# Patient Record
Sex: FEMALE | Race: WHITE | Employment: OTHER | URBAN - METROPOLITAN AREA
[De-identification: names, ages, dates, MRNs, and addresses within clinical notes are randomized per-mention and may not be internally consistent; named-entity substitution may affect disease eponyms.]

---

## 2023-04-08 ENCOUNTER — HOSPITAL ENCOUNTER (EMERGENCY)
Age: 88
Discharge: HOME | End: 2023-04-08
Attending: EMERGENCY MEDICINE
Payer: MEDICARE

## 2023-04-08 ENCOUNTER — APPOINTMENT (OUTPATIENT)
Dept: GENERAL RADIOLOGY | Age: 88
End: 2023-04-08
Attending: EMERGENCY MEDICINE
Payer: MEDICARE

## 2023-04-08 VITALS
WEIGHT: 180 LBS | DIASTOLIC BLOOD PRESSURE: 83 MMHG | HEART RATE: 72 BPM | OXYGEN SATURATION: 97 % | RESPIRATION RATE: 16 BRPM | TEMPERATURE: 98 F | SYSTOLIC BLOOD PRESSURE: 163 MMHG | HEIGHT: 62 IN | BODY MASS INDEX: 33.13 KG/M2

## 2023-04-08 DIAGNOSIS — R60.9 PERIPHERAL EDEMA: ICD-10-CM

## 2023-04-08 DIAGNOSIS — R53.1 WEAKNESS: Primary | ICD-10-CM

## 2023-04-08 LAB
ALBUMIN SERPL-MCNC: 2.5 G/DL (ref 3.4–5)
ALBUMIN/GLOB SERPL: 0.6 {RATIO} (ref 1–2)
ALP LIVER SERPL-CCNC: 84 U/L
ALT SERPL-CCNC: 17 U/L
ANION GAP SERPL CALC-SCNC: 6 MMOL/L (ref 0–18)
AST SERPL-CCNC: 25 U/L (ref 15–37)
BASOPHILS # BLD AUTO: 0.02 X10(3) UL (ref 0–0.2)
BASOPHILS NFR BLD AUTO: 0.3 %
BILIRUB SERPL-MCNC: 0.6 MG/DL (ref 0.1–2)
BILIRUB UR QL STRIP.AUTO: NEGATIVE
BUN BLD-MCNC: 12 MG/DL (ref 7–18)
CALCIUM BLD-MCNC: 9.1 MG/DL (ref 8.5–10.1)
CHLORIDE SERPL-SCNC: 102 MMOL/L (ref 98–112)
CLARITY UR REFRACT.AUTO: CLEAR
CO2 SERPL-SCNC: 26 MMOL/L (ref 21–32)
COLOR UR AUTO: YELLOW
CREAT BLD-MCNC: 0.86 MG/DL
EOSINOPHIL # BLD AUTO: 0.24 X10(3) UL (ref 0–0.7)
EOSINOPHIL NFR BLD AUTO: 3.3 %
ERYTHROCYTE [DISTWIDTH] IN BLOOD BY AUTOMATED COUNT: 14.7 %
GFR SERPLBLD BASED ON 1.73 SQ M-ARVRAT: 64 ML/MIN/1.73M2 (ref 60–?)
GLOBULIN PLAS-MCNC: 4.4 G/DL (ref 2.8–4.4)
GLUCOSE BLD-MCNC: 126 MG/DL (ref 70–99)
GLUCOSE UR STRIP.AUTO-MCNC: NEGATIVE MG/DL
HCT VFR BLD AUTO: 35.6 %
HGB BLD-MCNC: 11.9 G/DL
IMM GRANULOCYTES # BLD AUTO: 0.04 X10(3) UL (ref 0–1)
IMM GRANULOCYTES NFR BLD: 0.6 %
KETONES UR STRIP.AUTO-MCNC: NEGATIVE MG/DL
LEUKOCYTE ESTERASE UR QL STRIP.AUTO: NEGATIVE
LYMPHOCYTES # BLD AUTO: 0.54 X10(3) UL (ref 1–4)
LYMPHOCYTES NFR BLD AUTO: 7.5 %
MCH RBC QN AUTO: 30.7 PG (ref 26–34)
MCHC RBC AUTO-ENTMCNC: 33.4 G/DL (ref 31–37)
MCV RBC AUTO: 92 FL
MONOCYTES # BLD AUTO: 0.41 X10(3) UL (ref 0.1–1)
MONOCYTES NFR BLD AUTO: 5.7 %
NEUTROPHILS # BLD AUTO: 5.93 X10 (3) UL (ref 1.5–7.7)
NEUTROPHILS # BLD AUTO: 5.93 X10(3) UL (ref 1.5–7.7)
NEUTROPHILS NFR BLD AUTO: 82.6 %
NITRITE UR QL STRIP.AUTO: NEGATIVE
NT-PROBNP SERPL-MCNC: 1026 PG/ML (ref ?–450)
OSMOLALITY SERPL CALC.SUM OF ELEC: 279 MOSM/KG (ref 275–295)
PH UR STRIP.AUTO: 6 [PH] (ref 5–8)
PLATELET # BLD AUTO: 340 10(3)UL (ref 150–450)
POTASSIUM SERPL-SCNC: 4.5 MMOL/L (ref 3.5–5.1)
PROT SERPL-MCNC: 6.9 G/DL (ref 6.4–8.2)
PROT UR STRIP.AUTO-MCNC: NEGATIVE MG/DL
RBC # BLD AUTO: 3.87 X10(6)UL
SARS-COV-2 RNA RESP QL NAA+PROBE: NOT DETECTED
SODIUM SERPL-SCNC: 134 MMOL/L (ref 136–145)
SP GR UR STRIP.AUTO: 1.01 (ref 1–1.03)
TROPONIN I HIGH SENSITIVITY: 14 NG/L
TSI SER-ACNC: 3.11 MIU/ML (ref 0.36–3.74)
UROBILINOGEN UR STRIP.AUTO-MCNC: 1 MG/DL
WBC # BLD AUTO: 7.2 X10(3) UL (ref 4–11)

## 2023-04-08 PROCEDURE — 71046 X-RAY EXAM CHEST 2 VIEWS: CPT | Performed by: EMERGENCY MEDICINE

## 2023-04-08 PROCEDURE — 81015 MICROSCOPIC EXAM OF URINE: CPT | Performed by: EMERGENCY MEDICINE

## 2023-04-08 PROCEDURE — 99285 EMERGENCY DEPT VISIT HI MDM: CPT

## 2023-04-08 PROCEDURE — 93005 ELECTROCARDIOGRAM TRACING: CPT

## 2023-04-08 PROCEDURE — 93010 ELECTROCARDIOGRAM REPORT: CPT

## 2023-04-08 PROCEDURE — 96361 HYDRATE IV INFUSION ADD-ON: CPT

## 2023-04-08 PROCEDURE — 96374 THER/PROPH/DIAG INJ IV PUSH: CPT

## 2023-04-08 PROCEDURE — 84443 ASSAY THYROID STIM HORMONE: CPT | Performed by: EMERGENCY MEDICINE

## 2023-04-08 PROCEDURE — 84484 ASSAY OF TROPONIN QUANT: CPT | Performed by: EMERGENCY MEDICINE

## 2023-04-08 PROCEDURE — 80053 COMPREHEN METABOLIC PANEL: CPT | Performed by: EMERGENCY MEDICINE

## 2023-04-08 PROCEDURE — 81001 URINALYSIS AUTO W/SCOPE: CPT | Performed by: EMERGENCY MEDICINE

## 2023-04-08 PROCEDURE — 83880 ASSAY OF NATRIURETIC PEPTIDE: CPT | Performed by: EMERGENCY MEDICINE

## 2023-04-08 PROCEDURE — 85025 COMPLETE CBC W/AUTO DIFF WBC: CPT | Performed by: EMERGENCY MEDICINE

## 2023-04-08 RX ORDER — METOPROLOL SUCCINATE 100 MG/1
100 TABLET, EXTENDED RELEASE ORAL DAILY
COMMUNITY
End: 2023-04-11 | Stop reason: CLARIF

## 2023-04-08 RX ORDER — SODIUM CHLORIDE 9 MG/ML
125 INJECTION, SOLUTION INTRAVENOUS CONTINUOUS
Status: DISCONTINUED | OUTPATIENT
Start: 2023-04-08 | End: 2023-04-08

## 2023-04-08 RX ORDER — FUROSEMIDE 10 MG/ML
20 INJECTION INTRAMUSCULAR; INTRAVENOUS ONCE
Status: COMPLETED | OUTPATIENT
Start: 2023-04-08 | End: 2023-04-08

## 2023-04-08 RX ORDER — ATORVASTATIN CALCIUM 10 MG/1
10 TABLET, FILM COATED ORAL NIGHTLY
COMMUNITY
End: 2023-04-11 | Stop reason: CLARIF

## 2023-04-08 NOTE — DISCHARGE INSTRUCTIONS
Follow-up with a cardiologist, recommend full cardiac evaluation and echocardiogram, call Monday for an appointment  Continue regular medications  Return if worse  Recheck with primary care physician on Monday

## 2023-04-08 NOTE — ED INITIAL ASSESSMENT (HPI)
Pt states feeling generalized weakness and feels swollen  Co runny nose , denies cough or sob, states feeling sad and concerned about thyroid level, deniess si

## 2023-04-09 LAB
ATRIAL RATE: 71 BPM
P AXIS: -13 DEGREES
P-R INTERVAL: 238 MS
Q-T INTERVAL: 402 MS
QRS DURATION: 80 MS
QTC CALCULATION (BEZET): 436 MS
R AXIS: -29 DEGREES
T AXIS: 4 DEGREES
VENTRICULAR RATE: 71 BPM

## 2023-04-11 ENCOUNTER — APPOINTMENT (OUTPATIENT)
Dept: GENERAL RADIOLOGY | Facility: HOSPITAL | Age: 88
End: 2023-04-11
Attending: EMERGENCY MEDICINE
Payer: MEDICARE

## 2023-04-11 ENCOUNTER — HOSPITAL ENCOUNTER (INPATIENT)
Facility: HOSPITAL | Age: 88
LOS: 7 days | Discharge: HOME HEALTH | End: 2023-04-18
Attending: EMERGENCY MEDICINE | Admitting: HOSPITALIST
Payer: MEDICARE

## 2023-04-11 DIAGNOSIS — M35.3 PMR (POLYMYALGIA RHEUMATICA) (HCC): Primary | ICD-10-CM

## 2023-04-11 DIAGNOSIS — R70.0 ELEVATED SED RATE: ICD-10-CM

## 2023-04-11 DIAGNOSIS — R26.9 GAIT DISTURBANCE: ICD-10-CM

## 2023-04-11 DIAGNOSIS — E87.1 HYPONATREMIA: ICD-10-CM

## 2023-04-11 DIAGNOSIS — R53.1 ACUTE WEAKNESS: ICD-10-CM

## 2023-04-11 PROBLEM — R79.89 AZOTEMIA: Status: ACTIVE | Noted: 2023-04-11

## 2023-04-11 PROBLEM — D64.9 ANEMIA: Status: ACTIVE | Noted: 2023-04-11

## 2023-04-11 PROBLEM — R73.9 HYPERGLYCEMIA: Status: ACTIVE | Noted: 2023-04-11

## 2023-04-11 LAB
ALBUMIN SERPL-MCNC: 2.3 G/DL (ref 3.4–5)
ALBUMIN/GLOB SERPL: 0.5 {RATIO} (ref 1–2)
ALP LIVER SERPL-CCNC: 81 U/L
ALT SERPL-CCNC: 19 U/L
ANION GAP SERPL CALC-SCNC: 8 MMOL/L (ref 0–18)
AST SERPL-CCNC: 25 U/L (ref 15–37)
ATRIAL RATE: 72 BPM
BASOPHILS # BLD AUTO: 0.03 X10(3) UL (ref 0–0.2)
BASOPHILS NFR BLD AUTO: 0.3 %
BILIRUB SERPL-MCNC: 0.7 MG/DL (ref 0.1–2)
BILIRUB UR QL STRIP.AUTO: NEGATIVE
BUN BLD-MCNC: 17 MG/DL (ref 7–18)
CALCIUM BLD-MCNC: 9.1 MG/DL (ref 8.5–10.1)
CHLORIDE SERPL-SCNC: 99 MMOL/L (ref 98–112)
CK SERPL-CCNC: 44 U/L
CO2 SERPL-SCNC: 24 MMOL/L (ref 21–32)
COLOR UR AUTO: YELLOW
CREAT BLD-MCNC: 0.83 MG/DL
EOSINOPHIL # BLD AUTO: 0.3 X10(3) UL (ref 0–0.7)
EOSINOPHIL NFR BLD AUTO: 3 %
ERYTHROCYTE [DISTWIDTH] IN BLOOD BY AUTOMATED COUNT: 14.7 %
ERYTHROCYTE [SEDIMENTATION RATE] IN BLOOD: 83 MM/HR
FLUAV + FLUBV RNA SPEC NAA+PROBE: NEGATIVE
FLUAV + FLUBV RNA SPEC NAA+PROBE: NEGATIVE
GFR SERPLBLD BASED ON 1.73 SQ M-ARVRAT: 67 ML/MIN/1.73M2 (ref 60–?)
GLOBULIN PLAS-MCNC: 4.5 G/DL (ref 2.8–4.4)
GLUCOSE BLD-MCNC: 118 MG/DL (ref 70–99)
GLUCOSE UR STRIP.AUTO-MCNC: NEGATIVE MG/DL
HCT VFR BLD AUTO: 36.5 %
HGB BLD-MCNC: 11.9 G/DL
IMM GRANULOCYTES # BLD AUTO: 0.06 X10(3) UL (ref 0–1)
IMM GRANULOCYTES NFR BLD: 0.6 %
KETONES UR STRIP.AUTO-MCNC: NEGATIVE MG/DL
LEUKOCYTE ESTERASE UR QL STRIP.AUTO: NEGATIVE
LYMPHOCYTES # BLD AUTO: 0.66 X10(3) UL (ref 1–4)
LYMPHOCYTES NFR BLD AUTO: 6.6 %
MCH RBC QN AUTO: 30.3 PG (ref 26–34)
MCHC RBC AUTO-ENTMCNC: 32.6 G/DL (ref 31–37)
MCV RBC AUTO: 92.9 FL
MONOCYTES # BLD AUTO: 0.59 X10(3) UL (ref 0.1–1)
MONOCYTES NFR BLD AUTO: 5.9 %
NEUTROPHILS # BLD AUTO: 8.37 X10 (3) UL (ref 1.5–7.7)
NEUTROPHILS # BLD AUTO: 8.37 X10(3) UL (ref 1.5–7.7)
NEUTROPHILS NFR BLD AUTO: 83.6 %
NITRITE UR QL STRIP.AUTO: NEGATIVE
NT-PROBNP SERPL-MCNC: 1055 PG/ML (ref ?–450)
OSMOLALITY SERPL CALC.SUM OF ELEC: 275 MOSM/KG (ref 275–295)
P AXIS: -8 DEGREES
P-R INTERVAL: 228 MS
PH UR STRIP.AUTO: 5 [PH] (ref 5–8)
PLATELET # BLD AUTO: 349 10(3)UL (ref 150–450)
POTASSIUM SERPL-SCNC: 4 MMOL/L (ref 3.5–5.1)
PROT SERPL-MCNC: 6.8 G/DL (ref 6.4–8.2)
PROT UR STRIP.AUTO-MCNC: NEGATIVE MG/DL
Q-T INTERVAL: 388 MS
QRS DURATION: 88 MS
QTC CALCULATION (BEZET): 424 MS
R AXIS: -30 DEGREES
RBC # BLD AUTO: 3.93 X10(6)UL
RBC UR QL AUTO: NEGATIVE
RHEUMATOID FACT SERPL-ACNC: <10 IU/ML (ref ?–15)
RSV RNA SPEC NAA+PROBE: NEGATIVE
SARS-COV-2 RNA RESP QL NAA+PROBE: NOT DETECTED
SODIUM SERPL-SCNC: 131 MMOL/L (ref 136–145)
SP GR UR STRIP.AUTO: 1.01 (ref 1–1.03)
T AXIS: 18 DEGREES
UROBILINOGEN UR STRIP.AUTO-MCNC: 2 MG/DL
VENTRICULAR RATE: 72 BPM
WBC # BLD AUTO: 10 X10(3) UL (ref 4–11)

## 2023-04-11 PROCEDURE — 71045 X-RAY EXAM CHEST 1 VIEW: CPT | Performed by: EMERGENCY MEDICINE

## 2023-04-11 PROCEDURE — 99223 1ST HOSP IP/OBS HIGH 75: CPT | Performed by: HOSPITALIST

## 2023-04-11 RX ORDER — ACETAMINOPHEN 325 MG/1
650 TABLET ORAL ONCE
Status: COMPLETED | OUTPATIENT
Start: 2023-04-11 | End: 2023-04-11

## 2023-04-11 RX ORDER — PREDNISOLONE ACETATE 10 MG/ML
1 SUSPENSION/ DROPS OPHTHALMIC 3 TIMES DAILY
Status: DISCONTINUED | OUTPATIENT
Start: 2023-04-11 | End: 2023-04-18

## 2023-04-11 RX ORDER — HEPARIN SODIUM 5000 [USP'U]/ML
5000 INJECTION, SOLUTION INTRAVENOUS; SUBCUTANEOUS EVERY 12 HOURS SCHEDULED
Status: DISCONTINUED | OUTPATIENT
Start: 2023-04-11 | End: 2023-04-14

## 2023-04-11 RX ORDER — ATORVASTATIN CALCIUM 40 MG/1
40 TABLET, FILM COATED ORAL DAILY
Status: DISCONTINUED | OUTPATIENT
Start: 2023-04-11 | End: 2023-04-18

## 2023-04-11 RX ORDER — LEVOTHYROXINE SODIUM 0.07 MG/1
75 TABLET ORAL DAILY
Status: DISCONTINUED | OUTPATIENT
Start: 2023-04-12 | End: 2023-04-18

## 2023-04-11 RX ORDER — ACETAMINOPHEN 325 MG/1
TABLET ORAL
Status: COMPLETED
Start: 2023-04-11 | End: 2023-04-11

## 2023-04-11 RX ORDER — ASPIRIN 81 MG/1
81 TABLET ORAL DAILY
Status: DISCONTINUED | OUTPATIENT
Start: 2023-04-12 | End: 2023-04-18

## 2023-04-11 RX ORDER — LEVOTHYROXINE SODIUM 0.07 MG/1
75 TABLET ORAL DAILY
COMMUNITY
Start: 2023-03-01

## 2023-04-11 RX ORDER — PREDNISOLONE ACETATE 10 MG/ML
1 SUSPENSION/ DROPS OPHTHALMIC 3 TIMES DAILY
Status: ON HOLD | COMMUNITY
Start: 2023-04-01 | End: 2023-04-19

## 2023-04-11 RX ORDER — LATANOPROST 50 UG/ML
1 SOLUTION/ DROPS OPHTHALMIC NIGHTLY
Status: DISCONTINUED | OUTPATIENT
Start: 2023-04-11 | End: 2023-04-18

## 2023-04-11 RX ORDER — ATORVASTATIN CALCIUM 40 MG/1
40 TABLET, FILM COATED ORAL DAILY
COMMUNITY
Start: 2023-01-28

## 2023-04-11 RX ORDER — ESCITALOPRAM OXALATE 5 MG/1
5 TABLET ORAL DAILY
Status: DISCONTINUED | OUTPATIENT
Start: 2023-04-11 | End: 2023-04-18

## 2023-04-11 NOTE — ED INITIAL ASSESSMENT (HPI)
GENERALIZED BODY PAIN  BILATERAL LOWER LEG SWELLING   SINCE FEW DAYS WITH DIFFICULTY IN PASSING URINE . DENIES FEVER ' NO SHORTNESS OF BREATH

## 2023-04-12 ENCOUNTER — APPOINTMENT (OUTPATIENT)
Dept: ULTRASOUND IMAGING | Facility: HOSPITAL | Age: 88
End: 2023-04-12
Attending: HOSPITALIST
Payer: MEDICARE

## 2023-04-12 ENCOUNTER — APPOINTMENT (OUTPATIENT)
Dept: CV DIAGNOSTICS | Facility: HOSPITAL | Age: 88
End: 2023-04-12
Attending: HOSPITALIST
Payer: MEDICARE

## 2023-04-12 LAB
GLUCOSE BLD-MCNC: 144 MG/DL (ref 70–99)
GLUCOSE BLD-MCNC: 235 MG/DL (ref 70–99)

## 2023-04-12 PROCEDURE — 93306 TTE W/DOPPLER COMPLETE: CPT | Performed by: HOSPITALIST

## 2023-04-12 PROCEDURE — 99232 SBSQ HOSP IP/OBS MODERATE 35: CPT | Performed by: HOSPITALIST

## 2023-04-12 PROCEDURE — 93970 EXTREMITY STUDY: CPT | Performed by: HOSPITALIST

## 2023-04-12 PROCEDURE — 99223 1ST HOSP IP/OBS HIGH 75: CPT | Performed by: INTERNAL MEDICINE

## 2023-04-12 RX ORDER — METHYLPREDNISOLONE SODIUM SUCCINATE 125 MG/2ML
60 INJECTION, POWDER, LYOPHILIZED, FOR SOLUTION INTRAMUSCULAR; INTRAVENOUS EVERY 12 HOURS
Status: COMPLETED | OUTPATIENT
Start: 2023-04-12 | End: 2023-04-13

## 2023-04-12 NOTE — PLAN OF CARE
NURSING ADMISSION NOTE      Patient admitted via cart from ER to Rm 3626  Generalized pain better 5/10, decline pain med  Up in the [facility] with cane  BLE swollen  Will consult rheumatology in am  Pt has no IV access from ER, bruise noted on left AC. Refused IV insertion, verbalized we are not giving medicine thru IV  Informed patient, its needed for giving emergency medicine if something happens. She will think about it in am  0430:Pt agreed to insert Peripheral IV. Tolerated well. Oriented to room. Safety precautions initiated. Bed in low position. Call light in reach.   Problem: Patient/Family Goals  Goal: Patient/Family Long Term Goal  Description: Patient's Long Term Goal: return to baseline activity/ADL's    Interventions:  - pain med as needed  =consult  -PT/OT   - See additional Care Plan goals for specific interventions  Outcome: Progressing  Goal: Patient/Family Short Term Goal  Description: Patient's Short Term Goal: pain controlled    Interventions:   - pain med  -needs attended  - See additional Care Plan goals for specific interventions  Outcome: Progressing     Problem: PAIN - ADULT  Goal: Verbalizes/displays adequate comfort level or patient's stated pain goal  Description: INTERVENTIONS:  - Encourage pt to monitor pain and request assistance  - Assess pain using appropriate pain scale  - Administer analgesics based on type and severity of pain and evaluate response  - Implement non-pharmacological measures as appropriate and evaluate response  - Consider cultural and social influences on pain and pain management  - Manage/alleviate anxiety  - Utilize distraction and/or relaxation techniques  - Monitor for opioid side effects  - Notify MD/LIP if interventions unsuccessful or patient reports new pain  - Anticipate increased pain with activity and pre-medicate as appropriate  Outcome: Progressing     Problem: SAFETY ADULT - FALL  Goal: Free from fall injury  Description: INTERVENTIONS:  - Assess pt frequently for physical needs  - Identify cognitive and physical deficits and behaviors that affect risk of falls.   - [place] fall precautions as indicated by assessment.  - Educate pt/family on patient safety including physical limitations  - Instruct pt to call for assistance with activity based on assessment  - Modify environment to reduce risk of injury  - Provide assistive devices as appropriate  - Consider OT/PT consult to assist with strengthening/mobility  - Encourage toileting schedule  Outcome: Progressing     Problem: DISCHARGE PLANNING  Goal: Discharge to home or other facility with appropriate resources  Description: INTERVENTIONS:  - Identify barriers to discharge w/pt and caregiver  - Include patient/family/discharge partner in discharge planning  - Arrange for needed discharge resources and transportation as appropriate  - Identify discharge learning needs (meds, wound care, etc)  - Arrange for interpreters to assist at discharge as needed  - Consider post-discharge preferences of patient/family/discharge partner  - Complete POLST form as appropriate  - Assess patient's ability to be responsible for managing their own health  - Refer to Case Management Department for coordinating discharge planning if the patient needs post-hospital services based on physician/LIP order or complex needs related to functional status, cognitive ability or social support system  Outcome: Progressing

## 2023-04-12 NOTE — PLAN OF CARE
Assumed care of 81 y/o female @56, A/Ox4, RA, up w/walker x 1 assist, LT ear hard of hearing, rheumatolgy on, PT/OT consult, RTFA PIV-SL, Echo ordered, will continue to monitor      Problem: MUSCULOSKELETAL - ADULT  Goal: Return mobility to safest level of function  Description: INTERVENTIONS:  - Assess patient stability and activity tolerance for standing, transferring and ambulating w/ or w/o assistive devices  - Assist with transfers and ambulation using safe patient handling equipment as needed  - Ensure adequate protection for wounds/incisions during mobilization  - Obtain PT/OT consults as needed  - Advance activity as appropriate  - Communicate ordered activity level and limitations with patient/family  Outcome: Progressing     Problem: DISCHARGE PLANNING  Goal: Discharge to home or other facility with appropriate resources  Description: INTERVENTIONS:  - Identify barriers to discharge w/pt and caregiver  - Include patient/family/discharge partner in discharge planning  - Arrange for needed discharge resources and transportation as appropriate  - Identify discharge learning needs (meds, wound care, etc)  - Arrange for interpreters to assist at discharge as needed  - Consider post-discharge preferences of patient/family/discharge partner  - Complete POLST form as appropriate  - Assess patient's ability to be responsible for managing their own health  - Refer to Case Management Department for coordinating discharge planning if the patient needs post-hospital services based on physician/LIP order or complex needs related to functional status, cognitive ability or social support system  Outcome: Progressing

## 2023-04-12 NOTE — ED QUICK NOTES
Patient complains of pain to IV site requesting it to be removed. Patient declined additional IV site to be placed at this time, nothing IV ordered at this time. Informed patient that if she does have IV medications ordered for her in the future this topic will be revisited.

## 2023-04-12 NOTE — ED QUICK NOTES
Orders for admission, patient is aware of plan and ready to go upstairs. Any questions, please call ED RN Farzaneh 91 at extension [phone].      Patient Covid vaccination status: Fully vaccinated     COVID Test Ordered in ED: SARS-CoV-2/Flu A and B/RSV by PCR (GeneXpert)    COVID Suspicion at Admission: N/A    Running Infusions:  None    Mental Status/LOC at time of transport: AOx4    Other pertinent information:   CIWA score: N/A   NIH score:  N/A

## 2023-04-13 LAB
CCP IGG SERPL-ACNC: 1.6 U/ML (ref 0–6.9)
CRP SERPL-MCNC: 13.7 MG/DL (ref ?–0.3)
ERYTHROCYTE [SEDIMENTATION RATE] IN BLOOD: 69 MM/HR
GLUCOSE BLD-MCNC: 205 MG/DL (ref 70–99)
GLUCOSE BLD-MCNC: 228 MG/DL (ref 70–99)
NUCLEAR IGG TITR SER IF: POSITIVE {TITER}

## 2023-04-13 PROCEDURE — 99233 SBSQ HOSP IP/OBS HIGH 50: CPT | Performed by: INTERNAL MEDICINE

## 2023-04-13 PROCEDURE — 99232 SBSQ HOSP IP/OBS MODERATE 35: CPT | Performed by: HOSPITALIST

## 2023-04-13 RX ORDER — SULFAMETHOXAZOLE AND TRIMETHOPRIM 800; 160 MG/1; MG/1
1 TABLET ORAL
Status: DISCONTINUED | OUTPATIENT
Start: 2023-04-14 | End: 2023-04-18

## 2023-04-13 RX ORDER — FUROSEMIDE 10 MG/ML
20 INJECTION INTRAMUSCULAR; INTRAVENOUS
Status: DISCONTINUED | OUTPATIENT
Start: 2023-04-13 | End: 2023-04-16

## 2023-04-13 RX ORDER — ALENDRONATE SODIUM 70 MG/1
70 TABLET ORAL
Status: DISCONTINUED | OUTPATIENT
Start: 2023-04-14 | End: 2023-04-18

## 2023-04-13 NOTE — PROGRESS NOTES
Pt AOx4, NSR, room air, VSS. BLE edema improving, patient states she is feeling much better. Lasix, Solumedrol, eye drops. Up with stand by assist, cane and walker present in room. Continue to monitor.

## 2023-04-13 NOTE — DISCHARGE INSTRUCTIONS
From rheumatology standpoint  -- prednisone taper 60mg daily x 7 days, then 50mg daily x 7 days, then 40mg daily until outpatient follow up  -- alendronate once weekly (be sure to drink full glass of water and be seated or standing upright for one hour after taking medication)  -- bactrim (antibiotic) three times a week (Mon, Wed, Fri)  -- get updated labs in 2 weeks  -- my office will call to set up an outpatient appt for 2-3 weeks from now  -- try to watch diet- adopt diabetic diet while on the steroids  -- be sure to monitor your blood pressure at home and follow up with PCP on discharge  -- call my office with questions/concerns    Dr. Gabby Ch, DO  [facility]  4/13/2023    [facility]   [address] [facility] [address]  [address], [address]  Phone: [phone]  Fax: [phone] l

## 2023-04-13 NOTE — PROGRESS NOTES
Assumed care at [facility] x 4. [place] to Left ear. On R/A. Denies pain or discomfort. NSR on tele. US venous Doppler BLE to r/o DVTcompleted.    VSS  PIV to RFA -S/L  Up to the bathroom with walker/GB-1 person SBA

## 2023-04-14 ENCOUNTER — TELEPHONE (OUTPATIENT)
Dept: RHEUMATOLOGY | Facility: CLINIC | Age: 88
End: 2023-04-14

## 2023-04-14 LAB
ATRIAL RATE: 122 BPM
Q-T INTERVAL: 312 MS
QRS DURATION: 88 MS
QTC CALCULATION (BEZET): 443 MS
R AXIS: -23 DEGREES
T AXIS: 102 DEGREES
VENTRICULAR RATE: 121 BPM

## 2023-04-14 PROCEDURE — 99232 SBSQ HOSP IP/OBS MODERATE 35: CPT | Performed by: HOSPITALIST

## 2023-04-14 RX ORDER — FUROSEMIDE 20 MG/1
20 TABLET ORAL DAILY
Qty: 30 TABLET | Refills: 0 | Status: SHIPPED | OUTPATIENT
Start: 2023-04-14 | End: 2023-04-17

## 2023-04-14 RX ORDER — SULFAMETHOXAZOLE AND TRIMETHOPRIM 800; 160 MG/1; MG/1
1 TABLET ORAL
Qty: 36 TABLET | Refills: 0 | Status: SHIPPED | OUTPATIENT
Start: 2023-04-14 | End: 2023-04-17

## 2023-04-14 RX ORDER — DILTIAZEM HYDROCHLORIDE 5 MG/ML
5 INJECTION INTRAVENOUS ONCE
Status: COMPLETED | OUTPATIENT
Start: 2023-04-14 | End: 2023-04-14

## 2023-04-14 RX ORDER — FLUTICASONE PROPIONATE 50 MCG
1 SPRAY, SUSPENSION (ML) NASAL DAILY
Status: DISCONTINUED | OUTPATIENT
Start: 2023-04-14 | End: 2023-04-18

## 2023-04-14 RX ORDER — ALENDRONATE SODIUM 70 MG/1
70 TABLET ORAL
Qty: 12 TABLET | Refills: 0 | Status: SHIPPED | OUTPATIENT
Start: 2023-04-21 | End: 2023-04-17

## 2023-04-14 RX ORDER — ENOXAPARIN SODIUM 100 MG/ML
1 INJECTION SUBCUTANEOUS EVERY 12 HOURS SCHEDULED
Status: DISCONTINUED | OUTPATIENT
Start: 2023-04-14 | End: 2023-04-14

## 2023-04-14 RX ORDER — PREDNISONE 20 MG/1
TABLET ORAL
Qty: 180 TABLET | Refills: 1 | Status: SHIPPED | OUTPATIENT
Start: 2023-04-14 | End: 2023-04-17

## 2023-04-14 NOTE — CM/SW NOTE
04/14/23 1120   Choice of Post-Acute Provider   Informed patient of right to choose their preferred provider Yes   List of appropriate post-acute services provided to patient/family with quality data Yes   Patient/family choice [facility]- [address] [place] [address]   Information given to Patient;Daughter   Spoke to pt in the room, who indicated her dtr may wish to use the company she had with her 's care. Pt's dtr Eliza Alves contacted and she chose [facility]. Agency reserved in [address]. CM/SW will remain available for DC planning and support.     Lexi Collins, AMANDAN, V[facility]    [id number]

## 2023-04-14 NOTE — PROGRESS NOTES
Called patient's daughter Travis Gandhi per HIPAA. Discussed rheumatology plan going forward. Instructions included in discharge papers. Confirmed pharmacy and prescription sent. Recommended she clarify her other questions with hospitalist/ prior to discharge.     Xavi Royal,   [facility]  4/14/2023

## 2023-04-14 NOTE — PLAN OF CARE
Assumed care of [address]/o female @56  from home w/daughter, A&Ox4, RA, went into Afib-consulted cards-IV push cardizem x1, started elequis, carb controlled diet-no accuchecks, gets up w/cane or walker-SBA, continent of bowel/bladder, meds per [facility], denies any pain, safety precautions in place, will continue to monitor       Problem: SAFETY ADULT - FALL  Goal: Free from fall injury  Description: INTERVENTIONS:  - Assess pt frequently for physical needs  - Identify cognitive and physical deficits and behaviors that affect risk of falls.   - [place] fall precautions as indicated by assessment.  - Educate pt/family on patient safety including physical limitations  - Instruct pt to call for assistance with activity based on assessment  - Modify environment to reduce risk of injury  - Provide assistive devices as appropriate  - Consider OT/PT consult to assist with strengthening/mobility  - Encourage toileting schedule  Outcome: Progressing     Problem: MUSCULOSKELETAL - ADULT  Goal: Return mobility to safest level of function  Description: INTERVENTIONS:  - Assess patient stability and activity tolerance for standing, transferring and ambulating w/ or w/o assistive devices  - Assist with transfers and ambulation using safe patient handling equipment as needed  - Ensure adequate protection for wounds/incisions during mobilization  - Obtain PT/OT consults as needed  - Advance activity as appropriate  - Communicate ordered activity level and limitations with patient/family  Outcome: Progressing

## 2023-04-14 NOTE — TELEPHONE ENCOUNTER
Called pharmacy, spoke to pharmacist -  clarified sig with Dr. Bertha Hooks 60mg X1 wk, 50mg x1 wk then stay on 40mg. repeat labs in 2 wks from discharge date.      Okay to dispense 10mg tablets, but increase number of pills dispensed to 270

## 2023-04-14 NOTE — PLAN OF CARE
Assumed care at [address]. No acute distress noted. Pt A&Ox4. Room air. NSR. Carb controlled diet. Ambulatory SBA w/ cane or walker. PT rec HHPT- SW/Case mgmt following. Continent of bowel and bladder, brief and suzan in place. Medicated per MAR. No c/o pain. No n/v/d. Updated on POC: Steroid switching to PO tomorrow. Safety precautions in place. All needs met. Problem: PAIN - ADULT  Goal: Verbalizes/displays adequate comfort level or patient's stated pain goal  Description: INTERVENTIONS:  - Encourage pt to monitor pain and request assistance  - Assess pain using appropriate pain scale  - Administer analgesics based on type and severity of pain and evaluate response  - Implement non-pharmacological measures as appropriate and evaluate response  - Consider cultural and social influences on pain and pain management  - Manage/alleviate anxiety  - Utilize distraction and/or relaxation techniques  - Monitor for opioid side effects  - Notify MD/LIP if interventions unsuccessful or patient reports new pain  - Anticipate increased pain with activity and pre-medicate as appropriate  Outcome: Progressing     Problem: SAFETY ADULT - FALL  Goal: Free from fall injury  Description: INTERVENTIONS:  - Assess pt frequently for physical needs  - Identify cognitive and physical deficits and behaviors that affect risk of falls.   - [place] fall precautions as indicated by assessment.  - Educate pt/family on patient safety including physical limitations  - Instruct pt to call for assistance with activity based on assessment  - Modify environment to reduce risk of injury  - Provide assistive devices as appropriate  - Consider OT/PT consult to assist with strengthening/mobility  - Encourage toileting schedule  Outcome: Progressing     Problem: DISCHARGE PLANNING  Goal: Discharge to home or other facility with appropriate resources  Description: INTERVENTIONS:  - Identify barriers to discharge w/pt and caregiver  - Include patient/family/discharge partner in discharge planning  - Arrange for needed discharge resources and transportation as appropriate  - Identify discharge learning needs (meds, wound care, etc)  - Arrange for interpreters to assist at discharge as needed  - Consider post-discharge preferences of patient/family/discharge partner  - Complete POLST form as appropriate  - Assess patient's ability to be responsible for managing their own health  - Refer to Case Management Department for coordinating discharge planning if the patient needs post-hospital services based on physician/LIP order or complex needs related to functional status, cognitive ability or social support system  Outcome: Progressing     Problem: MUSCULOSKELETAL - ADULT  Goal: Return mobility to safest level of function  Description: INTERVENTIONS:  - Assess patient stability and activity tolerance for standing, transferring and ambulating w/ or w/o assistive devices  - Assist with transfers and ambulation using safe patient handling equipment as needed  - Ensure adequate protection for wounds/incisions during mobilization  - Obtain PT/OT consults as needed  - Advance activity as appropriate  - Communicate ordered activity level and limitations with patient/family  Outcome: Progressing     Problem: Impaired Activities of Daily Living  Goal: Achieve highest/safest level of independence in self care  Description: Interventions:  - Assess ability and encourage patient to participate in ADLs to maximize function  - Promote sitting position while performing ADLs such as feeding, grooming, and bathing  - Educate and encourage patient/family in tolerated functional activity level and precautions during self-care  Outcome: Progressing

## 2023-04-15 LAB
ANA NUCLEOLAR TITR SER IF: 160 {TITER}
ANION GAP SERPL CALC-SCNC: 6 MMOL/L (ref 0–18)
BUN BLD-MCNC: 29 MG/DL (ref 7–18)
C3 SERPL-MCNC: 127 MG/DL (ref 90–180)
C4 SERPL-MCNC: 20.8 MG/DL (ref 10–40)
CALCIUM BLD-MCNC: 8.7 MG/DL (ref 8.5–10.1)
CHLORIDE SERPL-SCNC: 107 MMOL/L (ref 98–112)
CO2 SERPL-SCNC: 26 MMOL/L (ref 21–32)
CREAT BLD-MCNC: 0.86 MG/DL
GFR SERPLBLD BASED ON 1.73 SQ M-ARVRAT: 64 ML/MIN/1.73M2 (ref 60–?)
GLUCOSE BLD-MCNC: 132 MG/DL (ref 70–99)
MAGNESIUM SERPL-MCNC: 2.3 MG/DL (ref 1.6–2.6)
OSMOLALITY SERPL CALC.SUM OF ELEC: 296 MOSM/KG (ref 275–295)
POTASSIUM SERPL-SCNC: 3.9 MMOL/L (ref 3.5–5.1)
SODIUM SERPL-SCNC: 139 MMOL/L (ref 136–145)

## 2023-04-15 PROCEDURE — 99232 SBSQ HOSP IP/OBS MODERATE 35: CPT | Performed by: HOSPITALIST

## 2023-04-15 NOTE — PROGRESS NOTES
Pt A&Ox3 Room Air  Resting in bed  Denies pain at this time  A.fib on Tele  Meds given per Mar  Voids  PT rec Home with Home PT  Safety precaution maintained  Will Continue to monitor

## 2023-04-16 PROCEDURE — 99232 SBSQ HOSP IP/OBS MODERATE 35: CPT | Performed by: HOSPITALIST

## 2023-04-16 RX ORDER — DOCUSATE SODIUM 100 MG/1
100 CAPSULE, LIQUID FILLED ORAL 2 TIMES DAILY
Status: DISCONTINUED | OUTPATIENT
Start: 2023-04-16 | End: 2023-04-18

## 2023-04-16 NOTE — PLAN OF CARE
Assumed care of pt @ 0730. Pt is A/Ox 4. On RA, VSS, afib on tele. Afib rvr at times SVR others, HR not controlled, better with PO cardizem  IV saline locked, flushed. Tolerating diet. PT/OT recommending- home with HH  Pt denies pain  Up as tolerated with walker  Intake/outputs WNL. Plan control HR, metoprolol increased, PO cardizem added. Updated POC with patient and family. Will continue to monitor. Problem: Patient/Family Goals  Goal: Patient/Family Long Term Goal  Description: Patient's Long Term Goal: return to baseline activity/ADL's    Interventions:  - pain med as needed  =consult  -PT/OT   - See additional Care Plan goals for specific interventions  Outcome: Progressing  Goal: Patient/Family Short Term Goal  Description: Patient's Short Term Goal: pain controlled    Interventions:   - pain med  -needs attended  - See additional Care Plan goals for specific interventions  Outcome: Progressing     Problem: PAIN - ADULT  Goal: Verbalizes/displays adequate comfort level or patient's stated pain goal  Description: INTERVENTIONS:  - Encourage pt to monitor pain and request assistance  - Assess pain using appropriate pain scale  - Administer analgesics based on type and severity of pain and evaluate response  - Implement non-pharmacological measures as appropriate and evaluate response  - Consider cultural and social influences on pain and pain management  - Manage/alleviate anxiety  - Utilize distraction and/or relaxation techniques  - Monitor for opioid side effects  - Notify MD/LIP if interventions unsuccessful or patient reports new pain  - Anticipate increased pain with activity and pre-medicate as appropriate  Outcome: Progressing     Problem: SAFETY ADULT - FALL  Goal: Free from fall injury  Description: INTERVENTIONS:  - Assess pt frequently for physical needs  - Identify cognitive and physical deficits and behaviors that affect risk of falls.   - [place] fall precautions as indicated by assessment.  - Educate pt/family on patient safety including physical limitations  - Instruct pt to call for assistance with activity based on assessment  - Modify environment to reduce risk of injury  - Provide assistive devices as appropriate  - Consider OT/PT consult to assist with strengthening/mobility  - Encourage toileting schedule  Outcome: Progressing     Problem: DISCHARGE PLANNING  Goal: Discharge to home or other facility with appropriate resources  Description: INTERVENTIONS:  - Identify barriers to discharge w/pt and caregiver  - Include patient/family/discharge partner in discharge planning  - Arrange for needed discharge resources and transportation as appropriate  - Identify discharge learning needs (meds, wound care, etc)  - Arrange for interpreters to assist at discharge as needed  - Consider post-discharge preferences of patient/family/discharge partner  - Complete POLST form as appropriate  - Assess patient's ability to be responsible for managing their own health  - Refer to Case Management Department for coordinating discharge planning if the patient needs post-hospital services based on physician/LIP order or complex needs related to functional status, cognitive ability or social support system  Outcome: Progressing     Problem: MUSCULOSKELETAL - ADULT  Goal: Return mobility to safest level of function  Description: INTERVENTIONS:  - Assess patient stability and activity tolerance for standing, transferring and ambulating w/ or w/o assistive devices  - Assist with transfers and ambulation using safe patient handling equipment as needed  - Ensure adequate protection for wounds/incisions during mobilization  - Obtain PT/OT consults as needed  - Advance activity as appropriate  - Communicate ordered activity level and limitations with patient/family  Outcome: Progressing     Problem: Impaired Functional Mobility  Goal: Achieve highest/safest level of mobility/gait  Description: Interventions:  - Assess patient's functional ability and stability  - Promote increasing activity/tolerance for mobility and gait  - Educate and engage patient/family in tolerated activity level and precautions  - Recommend use of  RW for transfers and ambulation  Outcome: Progressing     Problem: Impaired Activities of Daily Living  Goal: Achieve highest/safest level of independence in self care  Description: Interventions:  - Assess ability and encourage patient to participate in ADLs to maximize function  - Promote sitting position while performing ADLs such as feeding, grooming, and bathing  - Educate and encourage patient/family in tolerated functional activity level and precautions during self-care  - Provide support under elbow of weak side to prevent shoulder subluxation  Outcome: Progressing

## 2023-04-16 NOTE — PLAN OF CARE
Assumed patient care at 730. A/Ox4. Room air. Afib on tele. Carb control diet. Lasix given per [facility]. Ambulates with walker. Family at bedside. All safety precautions are in place and will continue with plan of care.       Problem: CARDIOVASCULAR - ADULT  Goal: Absence of cardiac arrhythmias or at baseline  Description: INTERVENTIONS:  - Continuous cardiac monitoring, monitor vital signs, obtain 12 lead EKG if indicated  - Evaluate effectiveness of antiarrhythmic and heart rate control medications as ordered  - Initiate emergency measures for life threatening arrhythmias  - Monitor electrolytes and administer replacement therapy as ordered  Outcome: Progressing     Problem: DISCHARGE PLANNING  Goal: Discharge to home or other facility with appropriate resources  Description: INTERVENTIONS:  - Identify barriers to discharge w/pt and caregiver  - Include patient/family/discharge partner in discharge planning  - Arrange for needed discharge resources and transportation as appropriate  - Identify discharge learning needs (meds, wound care, etc)  - Arrange for interpreters to assist at discharge as needed  - Consider post-discharge preferences of patient/family/discharge partner  - Complete POLST form as appropriate  - Assess patient's ability to be responsible for managing their own health  - Refer to Case Management Department for coordinating discharge planning if the patient needs post-hospital services based on physician/LIP order or complex needs related to functional status, cognitive ability or social support system  4/16/2023 1557 by Adalberto Ormond, RN  Outcome: Progressing  4/16/2023 1556 by Adalberto Ormond, RN  Outcome: Progressing

## 2023-04-17 ENCOUNTER — APPOINTMENT (OUTPATIENT)
Dept: ULTRASOUND IMAGING | Facility: HOSPITAL | Age: 88
End: 2023-04-17
Attending: HOSPITALIST
Payer: MEDICARE

## 2023-04-17 LAB — HGB BLD-MCNC: 14.4 G/DL

## 2023-04-17 PROCEDURE — 76882 US LMTD JT/FCL EVL NVASC XTR: CPT | Performed by: HOSPITALIST

## 2023-04-17 PROCEDURE — 99239 HOSP IP/OBS DSCHRG MGMT >30: CPT | Performed by: HOSPITALIST

## 2023-04-17 RX ORDER — METOPROLOL TARTRATE 50 MG/1
50 TABLET, FILM COATED ORAL 2 TIMES DAILY
Qty: 60 TABLET | Refills: 3 | Status: SHIPPED
Start: 2023-04-17 | End: 2023-04-18

## 2023-04-17 RX ORDER — ALENDRONATE SODIUM 70 MG/1
70 TABLET ORAL
Qty: 12 TABLET | Refills: 0 | Status: SHIPPED | OUTPATIENT
Start: 2023-04-21 | End: 2023-04-19

## 2023-04-17 RX ORDER — SULFAMETHOXAZOLE AND TRIMETHOPRIM 800; 160 MG/1; MG/1
1 TABLET ORAL
Qty: 36 TABLET | Refills: 0 | Status: SHIPPED | OUTPATIENT
Start: 2023-04-17 | End: 2023-04-18

## 2023-04-17 RX ORDER — PREDNISONE 20 MG/1
TABLET ORAL
Qty: 180 TABLET | Refills: 1 | Status: SHIPPED | OUTPATIENT
Start: 2023-04-17 | End: 2023-04-18

## 2023-04-17 RX ORDER — FUROSEMIDE 20 MG/1
20 TABLET ORAL DAILY
Qty: 30 TABLET | Refills: 0 | Status: SHIPPED | OUTPATIENT
Start: 2023-04-17 | End: 2023-04-18

## 2023-04-17 NOTE — CM/SW NOTE
Print Eliquis script sent to the [employer] for a price check. Will follow up once response received. KAMARI Benoit, RN-BC    [id number]    Addendum: Katiana Farfan from [employer] confirmed that Eliquis will cost $47/month and she is eligible for the savings card for first 30 days free. Katiana Farfan can fill script and apply savings card directly at discharge - RN to call when ready for dc.

## 2023-04-17 NOTE — CM/SW NOTE
Call received from Freda at [facility]. Provided him with contact information for pt's PCP office. STEFFANY/DREW to send AVS to agency at time of discharge. KAMARI Martínez, RN-BC    [id number]    Addendum: AVS sent to [facility] via [employer] and notified of plan for dc home today.

## 2023-04-17 NOTE — CM/SW NOTE
[employer] COST 8S $47/MONTH. FREE COUPON APPLIED FOR 30 DAYS.  CALL [facility] [phone] TO DELIVER TO PATIENT

## 2023-04-17 NOTE — PLAN OF CARE
Assumed care at [address] [facility]. No acute distress noted. Pt A&Ox4. Room air. NSR. Carb controlled diet. Continent of bowel and bladder. Ambulatory x1 w/ walker, SBA, BRP. PT rec HHPT. BLE edema. Medicated per MAR. No c/o pain. No n/v/d. Patient updated on POC. Safety precautions in place. All needs met.      Problem: CARDIOVASCULAR - ADULT  Goal: Absence of cardiac arrhythmias or at baseline  Description: INTERVENTIONS:  - Continuous cardiac monitoring, monitor vital signs, obtain 12 lead EKG if indicated  - Evaluate effectiveness of antiarrhythmic and heart rate control medications as ordered  - Initiate emergency measures for life threatening arrhythmias  - Monitor electrolytes and administer replacement therapy as ordered  Outcome: Not Progressing     Problem: Impaired Activities of Daily Living  Goal: Achieve highest/safest level of independence in self care  Description: Interventions:  - Assess ability and encourage patient to participate in ADLs to maximize function  - Promote sitting position while performing ADLs such as feeding, grooming, and bathing  - Educate and encourage patient/family in tolerated functional activity level and   Outcome: Not Progressing     Problem: Impaired Functional Mobility  Goal: Achieve highest/safest level of mobility/gait  Description: Interventions:  - Assess patient's functional ability and stability  - Promote increasing activity/tolerance for mobility and gait  - Educate and engage patient/family in tolerated activity level and precautions  Outcome: Not Progressing     Problem: MUSCULOSKELETAL - ADULT  Goal: Return mobility to safest level of function  Description: INTERVENTIONS:  - Assess patient stability and activity tolerance for standing, transferring and ambulating w/ or w/o assistive devices  - Assist with transfers and ambulation using safe patient handling equipment as needed  - Ensure adequate protection for wounds/incisions during mobilization  - Obtain PT/OT consults as needed  - Advance activity as appropriate  - Communicate ordered activity level and limitations with patient/family  Outcome: Not Progressing     Problem: Patient/Family Goals  Goal: Patient/Family Long Term Goal  Description: Patient's Long Term Goal: return to baseline activity/ADL's    Interventions:  - pain med as needed  =consult  -PT/OT   - See additional Care Plan goals for specific interventions  Outcome: Not Progressing  Goal: Patient/Family Short Term Goal  Description: Patient's Short Term Goal: pain controlled    Interventions:   - pain med  -needs attended  - See additional Care Plan goals for specific interventions  Outcome: Not Progressing

## 2023-04-17 NOTE — PLAN OF CARE
Assumed patient care at 0730  AOx3, RA  Afib: HR >140s when ambulating  Carb controlled diet  Accucheck   Continent of bowel and bladder  SBAx1 with walker. Reports no pain and no n/v  Reports dizziness during ambulation  Bed in lowest precautions and call light within reach  Safety precautions in place  Needs met at this time.                        Problem: PAIN - ADULT  Goal: Verbalizes/displays adequate comfort level or patient's stated pain goal  Description: INTERVENTIONS:  - Encourage pt to monitor pain and request assistance  - Assess pain using appropriate pain scale  - Administer analgesics based on type and severity of pain and evaluate response  - Implement non-pharmacological measures as appropriate and evaluate response  - Consider cultural and social influences on pain and pain management  - Manage/alleviate anxiety  - Utilize distraction and/or relaxation techniques  - Monitor for opioid side effects  - Notify MD/LIP if interventions unsuccessful or patient reports new pain  - Anticipate increased pain with activity and pre-medicate as appropriate  Outcome: Progressing

## 2023-04-18 VITALS
BODY MASS INDEX: 34.4 KG/M2 | DIASTOLIC BLOOD PRESSURE: 75 MMHG | HEART RATE: 50 BPM | SYSTOLIC BLOOD PRESSURE: 126 MMHG | TEMPERATURE: 97 F | RESPIRATION RATE: 16 BRPM | HEIGHT: 62 IN | OXYGEN SATURATION: 96 % | WEIGHT: 186.94 LBS

## 2023-04-18 LAB
CENTROMERE IGG SER-ACNC: 39.6 U/ML
DSDNA IGG SERPL IA-ACNC: 3.1 IU/ML
ENA AB SER QL IA: 2.5 UG/L
ENA AB SER QL IA: POSITIVE
ENA JO1 AB SER IA-ACNC: 0.3 U/ML
ENA RNP IGG SER IA-ACNC: 1.8 U/ML
ENA SCL70 IGG SER IA-ACNC: 1 U/ML
ENA SM IGG SER IA-ACNC: <0.7 U/ML
ENA SS-A IGG SER IA-ACNC: <0.4 U/ML
ENA SS-B IGG SER IA-ACNC: 0.4 U/ML
U1 SNRNP IGG SER IA-ACNC: 2.3 U/ML

## 2023-04-18 PROCEDURE — 99239 HOSP IP/OBS DSCHRG MGMT >30: CPT | Performed by: HOSPITALIST

## 2023-04-18 RX ORDER — PREDNISONE 20 MG/1
TABLET ORAL
Qty: 180 TABLET | Refills: 1 | Status: SHIPPED | OUTPATIENT
Start: 2023-04-18

## 2023-04-18 RX ORDER — SULFAMETHOXAZOLE AND TRIMETHOPRIM 800; 160 MG/1; MG/1
1 TABLET ORAL
Qty: 36 TABLET | Refills: 0 | Status: SHIPPED | OUTPATIENT
Start: 2023-04-19 | End: 2023-07-18

## 2023-04-18 RX ORDER — METOPROLOL TARTRATE 50 MG/1
50 TABLET, FILM COATED ORAL 2 TIMES DAILY
Qty: 60 TABLET | Refills: 3 | Status: SHIPPED | OUTPATIENT
Start: 2023-04-18 | End: 2023-04-20

## 2023-04-18 RX ORDER — FUROSEMIDE 20 MG/1
20 TABLET ORAL DAILY
Qty: 30 TABLET | Refills: 0 | Status: SHIPPED | OUTPATIENT
Start: 2023-04-18 | End: 2023-05-18

## 2023-04-18 NOTE — PLAN OF CARE
Assumed patient care at 730. A/Ox4. Room air. Afib on tele. Carb control diet. Right forearm hematoma from previous iv site elevated, compression and cold compress used. Ambulates with walker. Plan is for discharge today to home when ride is available. All safety precautions are in place and will continue with plan of care.     Problem: RISK FOR INFECTION - ADULT  Goal: Absence of fever/infection during anticipated neutropenic period  Description: INTERVENTIONS  - Monitor WBC  - Administer growth factors as ordered  - Implement neutropenic guidelines  Outcome: Progressing     Problem: PAIN - ADULT  Goal: Verbalizes/displays adequate comfort level or patient's stated pain goal  Description: INTERVENTIONS:  - Encourage pt to monitor pain and request assistance  - Assess pain using appropriate pain scale  - Administer analgesics based on type and severity of pain and evaluate response  - Implement non-pharmacological measures as appropriate and evaluate response  - Consider cultural and social influences on pain and pain management  - Manage/alleviate anxiety  - Utilize distraction and/or relaxation techniques  - Monitor for opioid side effects  - Notify MD/LIP if interventions unsuccessful or patient reports new pain  - Anticipate increased pain with activity and pre-medicate as appropriate  Outcome: Progressing

## 2023-04-18 NOTE — PLAN OF CARE
Assumed care at 1. No acute distress noted, family at bedside. Pt A&Ox4. Room air. Afib on tele, rate controlled, 66 currently. Carb controlled diet. Continent of bowel and bladder, pull ups and suzan in place. Ambulatory x1 w/ walker, SBA, BRP. PT rec HH w/ PT- SW following. Medicated per MAR. No c/o pain. No n/v/d.   IV removed by staff prior to planned discharge, bleeding began. Pressure applied. Hematoma developed. US showed 3.2 cm hematoma. Hgb 14.4. Eliquis on hold per MD. Ice packs applied. Pt medically cleared for discharge. Patient and family updated on POC. Safety precautions in place. Resting comfortably in bed. All needs met. Problem: PAIN - ADULT  Goal: Verbalizes/displays adequate comfort level or patient's stated pain goal  Description: INTERVENTIONS:  - Encourage pt to monitor pain and request assistance  - Assess pain using appropriate pain scale  - Administer analgesics based on type and severity of pain and evaluate response  - Implement non-pharmacological measures as appropriate and evaluate response  - Consider cultural and social influences on pain and pain management  - Manage/alleviate anxiety  - Utilize distraction and/or relaxation techniques  - Monitor for opioid side effects  - Notify MD/LIP if interventions unsuccessful or patient reports new pain  - Anticipate increased pain with activity and pre-medicate as appropriate  Outcome: Progressing     Problem: RISK FOR INFECTION - ADULT  Goal: Absence of fever/infection during anticipated neutropenic period  Description: INTERVENTIONS  - Monitor WBC  - Administer growth factors as ordered  - Implement neutropenic guidelines  Outcome: Progressing     Problem: SAFETY ADULT - FALL  Goal: Free from fall injury  Description: INTERVENTIONS:  - Assess pt frequently for physical needs  - Identify cognitive and physical deficits and behaviors that affect risk of falls. - [place] fall precautions as indicated by assessment.   - Educate pt/family on patient safety including physical limitations  - Instruct pt to call for assistance with activity based on assessment  - Modify environment to reduce risk of injury  - Provide assistive devices as appropriate  - Consider OT/PT consult to assist with strengthening/mobility  - Encourage toileting schedule  Outcome: Progressing

## 2023-04-18 NOTE — DISCHARGE PLANNING
Tele removed from patient. Went over discharge paperwork with pt and pts family. Answered any questions pt had at time of discharge.

## 2023-04-18 NOTE — CM/SW NOTE
Pt was reportedly medically cleared for DC last PM and was getting ready to leave when she developed a large hematoma at her dc'd IV site. DC was held. Pt identified as medically ready for DC this am in multidisciplinary rounds. [facility] notified that pt will dc today. AVS sent via [employer].      Pt's dtr to transport her home this PM.    AMANDA GarcíaN, VIA [facility]    [id number]

## 2023-04-19 ENCOUNTER — HOSPITAL ENCOUNTER (OUTPATIENT)
Facility: HOSPITAL | Age: 88
Setting detail: OBSERVATION
Discharge: HOME | End: 2023-04-20
Attending: EMERGENCY MEDICINE | Admitting: INTERNAL MEDICINE
Payer: MEDICARE

## 2023-04-19 ENCOUNTER — APPOINTMENT (OUTPATIENT)
Dept: GENERAL RADIOLOGY | Facility: HOSPITAL | Age: 88
End: 2023-04-19
Attending: EMERGENCY MEDICINE
Payer: MEDICARE

## 2023-04-19 ENCOUNTER — PATIENT OUTREACH (OUTPATIENT)
Dept: CASE MANAGEMENT | Age: 88
End: 2023-04-19

## 2023-04-19 DIAGNOSIS — R07.9 CHEST PAIN OF UNCERTAIN ETIOLOGY: Primary | ICD-10-CM

## 2023-04-19 DIAGNOSIS — Z02.9 ENCOUNTERS FOR ADMINISTRATIVE PURPOSE: ICD-10-CM

## 2023-04-19 LAB
ALBUMIN SERPL-MCNC: 2.6 G/DL (ref 3.4–5)
ALBUMIN/GLOB SERPL: 0.6 {RATIO} (ref 1–2)
ALP LIVER SERPL-CCNC: 96 U/L
ALT SERPL-CCNC: 34 U/L
ANION GAP SERPL CALC-SCNC: 4 MMOL/L (ref 0–18)
AST SERPL-CCNC: 30 U/L (ref 15–37)
BASOPHILS # BLD AUTO: 0.04 X10(3) UL (ref 0–0.2)
BASOPHILS NFR BLD AUTO: 0.3 %
BILIRUB SERPL-MCNC: 0.4 MG/DL (ref 0.1–2)
BUN BLD-MCNC: 29 MG/DL (ref 7–18)
CALCIUM BLD-MCNC: 8.5 MG/DL (ref 8.5–10.1)
CHLORIDE SERPL-SCNC: 105 MMOL/L (ref 98–112)
CO2 SERPL-SCNC: 24 MMOL/L (ref 21–32)
CREAT BLD-MCNC: 1.26 MG/DL
EOSINOPHIL # BLD AUTO: 0.04 X10(3) UL (ref 0–0.7)
EOSINOPHIL NFR BLD AUTO: 0.3 %
ERYTHROCYTE [DISTWIDTH] IN BLOOD BY AUTOMATED COUNT: 15.2 %
EST. AVERAGE GLUCOSE BLD GHB EST-MCNC: 163 MG/DL (ref 68–126)
GFR SERPLBLD BASED ON 1.73 SQ M-ARVRAT: 41 ML/MIN/1.73M2 (ref 60–?)
GLOBULIN PLAS-MCNC: 4.1 G/DL (ref 2.8–4.4)
GLUCOSE BLD-MCNC: 218 MG/DL (ref 70–99)
GLUCOSE BLD-MCNC: 221 MG/DL (ref 70–99)
GLUCOSE BLD-MCNC: 241 MG/DL (ref 70–99)
HBA1C MFR BLD: 7.3 % (ref ?–5.7)
HCT VFR BLD AUTO: 44.2 %
HGB BLD-MCNC: 14.6 G/DL
IMM GRANULOCYTES # BLD AUTO: 0.21 X10(3) UL (ref 0–1)
IMM GRANULOCYTES NFR BLD: 1.5 %
LYMPHOCYTES # BLD AUTO: 0.65 X10(3) UL (ref 1–4)
LYMPHOCYTES NFR BLD AUTO: 4.8 %
MCH RBC QN AUTO: 30.5 PG (ref 26–34)
MCHC RBC AUTO-ENTMCNC: 33 G/DL (ref 31–37)
MCV RBC AUTO: 92.5 FL
MONOCYTES # BLD AUTO: 0.55 X10(3) UL (ref 0.1–1)
MONOCYTES NFR BLD AUTO: 4 %
NEUTROPHILS # BLD AUTO: 12.11 X10 (3) UL (ref 1.5–7.7)
NEUTROPHILS # BLD AUTO: 12.11 X10(3) UL (ref 1.5–7.7)
NEUTROPHILS NFR BLD AUTO: 89.1 %
NT-PROBNP SERPL-MCNC: 3210 PG/ML (ref ?–450)
OSMOLALITY SERPL CALC.SUM OF ELEC: 288 MOSM/KG (ref 275–295)
PLATELET # BLD AUTO: 410 10(3)UL (ref 150–450)
POTASSIUM SERPL-SCNC: 4 MMOL/L (ref 3.5–5.1)
PROT SERPL-MCNC: 6.7 G/DL (ref 6.4–8.2)
Q-T INTERVAL: 374 MS
QRS DURATION: 76 MS
QTC CALCULATION (BEZET): 436 MS
R AXIS: -31 DEGREES
RBC # BLD AUTO: 4.78 X10(6)UL
SODIUM SERPL-SCNC: 133 MMOL/L (ref 136–145)
T AXIS: -10 DEGREES
TROPONIN I HIGH SENSITIVITY: 15 NG/L
TROPONIN I HIGH SENSITIVITY: 18 NG/L
VENTRICULAR RATE: 82 BPM
WBC # BLD AUTO: 13.6 X10(3) UL (ref 4–11)

## 2023-04-19 PROCEDURE — 99223 1ST HOSP IP/OBS HIGH 75: CPT | Performed by: HOSPITALIST

## 2023-04-19 PROCEDURE — 1111F DSCHRG MED/CURRENT MED MERGE: CPT

## 2023-04-19 PROCEDURE — 71045 X-RAY EXAM CHEST 1 VIEW: CPT | Performed by: EMERGENCY MEDICINE

## 2023-04-19 RX ORDER — LATANOPROST 50 UG/ML
1 SOLUTION/ DROPS OPHTHALMIC NIGHTLY
Status: DISCONTINUED | OUTPATIENT
Start: 2023-04-19 | End: 2023-04-20

## 2023-04-19 RX ORDER — POLYETHYLENE GLYCOL 3350 17 G/17G
17 POWDER, FOR SOLUTION ORAL DAILY PRN
Status: DISCONTINUED | OUTPATIENT
Start: 2023-04-19 | End: 2023-04-20

## 2023-04-19 RX ORDER — ACETAMINOPHEN 500 MG
500 TABLET ORAL EVERY 4 HOURS PRN
Status: DISCONTINUED | OUTPATIENT
Start: 2023-04-19 | End: 2023-04-20

## 2023-04-19 RX ORDER — ALENDRONATE SODIUM 70 MG/1
70 TABLET ORAL
Qty: 12 TABLET | Refills: 0 | Status: SHIPPED | OUTPATIENT
Start: 2023-04-21

## 2023-04-19 RX ORDER — ONDANSETRON 2 MG/ML
4 INJECTION INTRAMUSCULAR; INTRAVENOUS EVERY 6 HOURS PRN
Status: DISCONTINUED | OUTPATIENT
Start: 2023-04-19 | End: 2023-04-20

## 2023-04-19 RX ORDER — SODIUM PHOSPHATE, DIBASIC AND SODIUM PHOSPHATE, MONOBASIC 7; 19 G/133ML; G/133ML
1 ENEMA RECTAL ONCE AS NEEDED
Status: DISCONTINUED | OUTPATIENT
Start: 2023-04-19 | End: 2023-04-20

## 2023-04-19 RX ORDER — MELATONIN
3 NIGHTLY PRN
Status: DISCONTINUED | OUTPATIENT
Start: 2023-04-19 | End: 2023-04-20

## 2023-04-19 RX ORDER — ATORVASTATIN CALCIUM 40 MG/1
40 TABLET, FILM COATED ORAL NIGHTLY
Status: DISCONTINUED | OUTPATIENT
Start: 2023-04-19 | End: 2023-04-20

## 2023-04-19 RX ORDER — ESCITALOPRAM OXALATE 5 MG/1
5 TABLET ORAL DAILY
Status: DISCONTINUED | OUTPATIENT
Start: 2023-04-20 | End: 2023-04-20

## 2023-04-19 RX ORDER — SODIUM CHLORIDE 9 MG/ML
INJECTION, SOLUTION INTRAVENOUS CONTINUOUS
Status: DISCONTINUED | OUTPATIENT
Start: 2023-04-19 | End: 2023-04-20

## 2023-04-19 RX ORDER — SULFAMETHOXAZOLE AND TRIMETHOPRIM 800; 160 MG/1; MG/1
1 TABLET ORAL
Status: DISCONTINUED | OUTPATIENT
Start: 2023-04-19 | End: 2023-04-20

## 2023-04-19 RX ORDER — METOCLOPRAMIDE HYDROCHLORIDE 5 MG/ML
10 INJECTION INTRAMUSCULAR; INTRAVENOUS EVERY 8 HOURS PRN
Status: DISCONTINUED | OUTPATIENT
Start: 2023-04-19 | End: 2023-04-19

## 2023-04-19 RX ORDER — ENOXAPARIN SODIUM 100 MG/ML
30 INJECTION SUBCUTANEOUS DAILY
Status: DISCONTINUED | OUTPATIENT
Start: 2023-04-20 | End: 2023-04-20

## 2023-04-19 RX ORDER — BISACODYL 10 MG
10 SUPPOSITORY, RECTAL RECTAL
Status: DISCONTINUED | OUTPATIENT
Start: 2023-04-19 | End: 2023-04-20

## 2023-04-19 RX ORDER — LEVOTHYROXINE SODIUM 0.07 MG/1
75 TABLET ORAL
Status: DISCONTINUED | OUTPATIENT
Start: 2023-04-20 | End: 2023-04-20

## 2023-04-19 RX ORDER — METOCLOPRAMIDE HYDROCHLORIDE 5 MG/ML
5 INJECTION INTRAMUSCULAR; INTRAVENOUS EVERY 8 HOURS PRN
Status: DISCONTINUED | OUTPATIENT
Start: 2023-04-19 | End: 2023-04-20

## 2023-04-19 RX ORDER — NICOTINE POLACRILEX 4 MG
15 LOZENGE BUCCAL
Status: DISCONTINUED | OUTPATIENT
Start: 2023-04-19 | End: 2023-04-20

## 2023-04-19 RX ORDER — ASPIRIN 81 MG/1
81 TABLET ORAL DAILY
Status: DISCONTINUED | OUTPATIENT
Start: 2023-04-19 | End: 2023-04-20

## 2023-04-19 RX ORDER — NICOTINE POLACRILEX 4 MG
30 LOZENGE BUCCAL
Status: DISCONTINUED | OUTPATIENT
Start: 2023-04-19 | End: 2023-04-20

## 2023-04-19 RX ORDER — SENNOSIDES 8.6 MG
17.2 TABLET ORAL NIGHTLY PRN
Status: DISCONTINUED | OUTPATIENT
Start: 2023-04-19 | End: 2023-04-20

## 2023-04-19 RX ORDER — DEXTROSE MONOHYDRATE 25 G/50ML
50 INJECTION, SOLUTION INTRAVENOUS
Status: DISCONTINUED | OUTPATIENT
Start: 2023-04-19 | End: 2023-04-20

## 2023-04-19 NOTE — PLAN OF CARE
Rec'd pt from ED at ~1630. A&O x 4. Admission navigator and PTA med list completed with pt and son-in-law at bedside. Tele shows a fib. O2 sats adequate on RA. Pt up w/ 1 and walker. No C/O pain or SOB. Skin dry and intact, bruising and inflammation noted on rt wrist from previous admission IV. 0.9 running at 83cc/hr. Bed locked and in low position, call light and personal items within reach. Will continue to monitor.      Problem: Patient/Family Goals  Goal: Patient/Family Long Term Goal  Description: Patient's Long Term Goal: No readmission    Interventions:  - Take medications as prescribed  - See additional Care Plan goals for specific interventions  Outcome: Progressing  Goal: Patient/Family Short Term Goal  Description: Patient's Short Term Goal: Go home    Interventions:   - Take medications as prescribed  - See additional Care Plan goals for specific interventions  Outcome: Progressing

## 2023-04-19 NOTE — PROGRESS NOTES
[facility] Pharmacy Note:  Renal Dose Adjustment for enoxaparin (LOVENOX)    Garima Mcdermott has been prescribed enoxaparin 40 mg subcutaneously every 24 hours. Estimated Creatinine Clearance: 23.5 mL/min (A) (based on SCr of 1.26 mg/dL (H)). Calculated CrCl 20 to 30 mL/min so the dose of Enoxaparin (LOVENOX) has been changed to enoxaparin 30 mg every 24 hours per P&T approved protocol. Pharmacy will continue to follow, and make additional adjustments if needed.       Thank you,  Shaw De La Cruz, PharmD  4/19/2023 6:37 PM

## 2023-04-19 NOTE — PROGRESS NOTES
REMI received VM from daughter Meka Maldonado stating that as soon as our phone call ended pt started complaining of chest pain and paramedics were called and pt taken to THE [facility] ER. In VM, she states that there is a very long wait and if there is anything she could do to speed up the process. REMI reviewed chart and pt is already being assessed. Pt needs already being addressed. REMI closing encounter.

## 2023-04-19 NOTE — PLAN OF CARE
Received a call from TCM staff - pt did not receive her printed script for alendronate at discharge.   Sent new script directly to her [place].

## 2023-04-19 NOTE — ED INITIAL ASSESSMENT (HPI)
Patient reports c/o left side pain which radiated to jaw since this am. Patient took 324 mg Aspirin at home. Patient currently denies chest pain. Patient discharged from hospital last night for edema .

## 2023-04-19 NOTE — PROGRESS NOTES
04/19/23 1641 04/19/23 1642 04/19/23 1643   Vital Signs   BP (!) 132/98 (!) 132/94 98/75   MAP (mmHg) 107 101 80   BP Location Left arm Left arm Left arm   BP Method Automatic Automatic Automatic   Patient Position Lying Sitting Standing       Orthostatic Bps on admission (+), pt symptomatic

## 2023-04-19 NOTE — ED QUICK NOTES
Orders for admission, patient is aware of plan and ready to go upstairs. Any questions, please call ED RN [phone] .      Patient Covid vaccination status: Fully vaccinated     COVID Test Ordered in ED: None    COVID Suspicion at Admission: N/A    Running Infusions:  None    Mental Status/LOC at time of transport: x3    Other pertinent information:   CIWA score: N/A   NIH score:  N/A

## 2023-04-20 VITALS
HEART RATE: 70 BPM | RESPIRATION RATE: 17 BRPM | TEMPERATURE: 98 F | OXYGEN SATURATION: 100 % | WEIGHT: 168 LBS | DIASTOLIC BLOOD PRESSURE: 89 MMHG | SYSTOLIC BLOOD PRESSURE: 130 MMHG | BODY MASS INDEX: 31 KG/M2

## 2023-04-20 PROBLEM — R07.9 CHEST PAIN OF UNCERTAIN ETIOLOGY: Status: RESOLVED | Noted: 2023-04-19 | Resolved: 2023-04-20

## 2023-04-20 LAB
ANION GAP SERPL CALC-SCNC: 4 MMOL/L (ref 0–18)
BUN BLD-MCNC: 22 MG/DL (ref 7–18)
CALCIUM BLD-MCNC: 8.2 MG/DL (ref 8.5–10.1)
CHLORIDE SERPL-SCNC: 111 MMOL/L (ref 98–112)
CO2 SERPL-SCNC: 23 MMOL/L (ref 21–32)
CREAT BLD-MCNC: 0.83 MG/DL
GFR SERPLBLD BASED ON 1.73 SQ M-ARVRAT: 67 ML/MIN/1.73M2 (ref 60–?)
GLUCOSE BLD-MCNC: 108 MG/DL (ref 70–99)
GLUCOSE BLD-MCNC: 108 MG/DL (ref 70–99)
GLUCOSE BLD-MCNC: 195 MG/DL (ref 70–99)
GLUCOSE BLD-MCNC: 202 MG/DL (ref 70–99)
OSMOLALITY SERPL CALC.SUM OF ELEC: 290 MOSM/KG (ref 275–295)
POTASSIUM SERPL-SCNC: 4.7 MMOL/L (ref 3.5–5.1)
SODIUM SERPL-SCNC: 138 MMOL/L (ref 136–145)

## 2023-04-20 PROCEDURE — 99239 HOSP IP/OBS DSCHRG MGMT >30: CPT | Performed by: STUDENT IN AN ORGANIZED HEALTH CARE EDUCATION/TRAINING PROGRAM

## 2023-04-20 NOTE — PLAN OF CARE
Assumed care of pt at 0700. Pt is A&Ox4. Up with standby and a walker. Controlled a-fib, no c/o chest pain, orthos negative. On RA, bilateral lung sounds diminished with basilar crackles, no coughing. Denies dyspnea. Non-pitting edema of BLE noted. Generalized bruising noted. Continent of bowel and bladder, abdomen soft and non-tender, last BM 4/20/23. Pt and family updated on POC    POC: Discharge today    Problem: Patient/Family Goals  Goal: Patient/Family Long Term Goal  Description: Patient's Long Term Goal: No readmission    Interventions:  - Take medications as prescribed  - See additional Care Plan goals for specific interventions  Outcome: Progressing  Goal: Patient/Family Short Term Goal  Description: Patient's Short Term Goal: Go home    Interventions:   - Take medications as prescribed  - See additional Care Plan goals for specific interventions  Outcome: Progressing     Problem: CARDIOVASCULAR - ADULT  Goal: Maintains optimal cardiac output and hemodynamic stability  Description: INTERVENTIONS:  - Monitor vital signs, rhythm, and trends  - Monitor for bleeding, hypotension and signs of decreased cardiac output  - Evaluate effectiveness of vasoactive medications to optimize hemodynamic stability  - Monitor arterial and/or venous puncture sites for bleeding and/or hematoma  - Assess quality of pulses, skin color and temperature  - Assess for signs of decreased coronary artery perfusion - ex.  Angina  - Evaluate fluid balance, assess for edema, trend weights  Outcome: Progressing  Goal: Absence of cardiac arrhythmias or at baseline  Description: INTERVENTIONS:  - Continuous cardiac monitoring, monitor vital signs, obtain 12 lead EKG if indicated  - Evaluate effectiveness of antiarrhythmic and heart rate control medications as ordered  - Initiate emergency measures for life threatening arrhythmias  - Monitor electrolytes and administer replacement therapy as ordered  Outcome: Progressing

## 2023-04-20 NOTE — PLAN OF CARE
Pt is ok to discharge per primary and consults. Discharge instructions including meds & follow ups given. Patient verbalizes understanding & questions answered. IV removed, tele monitor discontinued, all belongings taken with patient. Pt transported off unit via wheelchair to [facility].

## 2023-04-20 NOTE — PLAN OF CARE
Pt is a/ox4. On room air. Vitals stable. A-fib on tele. Continent. Voiding. Up with sba in the room. Fluids infusing per order. Denies pain.  All needs met at this time      Problem: Patient/Family Goals  Goal: Patient/Family Long Term Goal  Description: Patient's Long Term Goal: No readmission    Interventions:  - Take medications as prescribed  - See additional Care Plan goals for specific interventions  Outcome: Progressing  Goal: Patient/Family Short Term Goal  Description: Patient's Short Term Goal: Go home    Interventions:   - Take medications as prescribed  - See additional Care Plan goals for specific interventions  Outcome: Progressing

## 2023-04-26 ENCOUNTER — OFFICE VISIT (OUTPATIENT)
Dept: INTERNAL MEDICINE CLINIC | Facility: CLINIC | Age: 88
End: 2023-04-26
Payer: MEDICARE

## 2023-04-26 VITALS
WEIGHT: 171 LBS | HEIGHT: 62 IN | HEART RATE: 83 BPM | OXYGEN SATURATION: 100 % | SYSTOLIC BLOOD PRESSURE: 108 MMHG | DIASTOLIC BLOOD PRESSURE: 72 MMHG | BODY MASS INDEX: 31.47 KG/M2 | RESPIRATION RATE: 18 BRPM | TEMPERATURE: 98 F

## 2023-04-26 DIAGNOSIS — H93.8X2 CONGESTION OF LEFT EAR: ICD-10-CM

## 2023-04-26 DIAGNOSIS — I48.91 ATRIAL FIBRILLATION WITH RVR (HCC): ICD-10-CM

## 2023-04-26 DIAGNOSIS — R73.09 ELEVATED HEMOGLOBIN A1C: ICD-10-CM

## 2023-04-26 DIAGNOSIS — R07.89 OTHER CHEST PAIN: Primary | ICD-10-CM

## 2023-04-26 DIAGNOSIS — M35.3 PMR (POLYMYALGIA RHEUMATICA) (HCC): ICD-10-CM

## 2023-04-26 DIAGNOSIS — E03.9 HYPOTHYROIDISM, UNSPECIFIED TYPE: ICD-10-CM

## 2023-04-26 DIAGNOSIS — R60.0 BILATERAL LOWER EXTREMITY EDEMA: ICD-10-CM

## 2023-04-26 DIAGNOSIS — I10 PRIMARY HYPERTENSION: ICD-10-CM

## 2023-04-26 PROCEDURE — 1111F DSCHRG MED/CURRENT MED MERGE: CPT | Performed by: NURSE PRACTITIONER

## 2023-04-26 PROCEDURE — 99495 TRANSJ CARE MGMT MOD F2F 14D: CPT | Performed by: NURSE PRACTITIONER

## 2023-04-26 PROCEDURE — 1170F FXNL STATUS ASSESSED: CPT | Performed by: NURSE PRACTITIONER

## 2023-04-26 NOTE — PATIENT INSTRUCTIONS
PATIENT INSTRUCTIONS:    Rheumatology Dr. Marina Murry office should be calling you directly to assist in scheduling your follow up appointment. Randy Hall DO  Karla Mccormack [phone]  [phone]    2.  Cardiology Dr. Radha Schreiber will see you April 27th at 8:20am  Dena Goodell., MD  [address] [place] [address]  [phone]    Compression stockings on [employer]-type in [employer] spun and you want 15-20 compression on during day and off at night  For congestion:  Flonase-generic is fine 1-2 sprays each nostril daily x 14 days  Zyrtec-generic is fine 10mg before bedtime x 14 days  Have labs done on 5/2 [facility]

## 2023-05-02 ENCOUNTER — LAB ENCOUNTER (OUTPATIENT)
Dept: LAB | Age: 88
End: 2023-05-02
Attending: INTERNAL MEDICINE
Payer: MEDICARE

## 2023-05-02 DIAGNOSIS — M35.3 PMR (POLYMYALGIA RHEUMATICA) (HCC): ICD-10-CM

## 2023-05-02 DIAGNOSIS — R70.0 ELEVATED SED RATE: ICD-10-CM

## 2023-05-02 LAB
ALBUMIN SERPL-MCNC: 3.1 G/DL (ref 3.4–5)
ALBUMIN/GLOB SERPL: 1 {RATIO} (ref 1–2)
ALP LIVER SERPL-CCNC: 82 U/L
ALT SERPL-CCNC: 55 U/L
ANION GAP SERPL CALC-SCNC: 7 MMOL/L (ref 0–18)
AST SERPL-CCNC: 30 U/L (ref 15–37)
BASOPHILS # BLD AUTO: 0.01 X10(3) UL (ref 0–0.2)
BASOPHILS NFR BLD AUTO: 0.1 %
BILIRUB SERPL-MCNC: 1 MG/DL (ref 0.1–2)
BUN BLD-MCNC: 27 MG/DL (ref 7–18)
CALCIUM BLD-MCNC: 8.8 MG/DL (ref 8.5–10.1)
CHLORIDE SERPL-SCNC: 108 MMOL/L (ref 98–112)
CO2 SERPL-SCNC: 24 MMOL/L (ref 21–32)
CREAT BLD-MCNC: 1.21 MG/DL
CRP SERPL-MCNC: <0.29 MG/DL (ref ?–0.3)
EOSINOPHIL # BLD AUTO: 0.12 X10(3) UL (ref 0–0.7)
EOSINOPHIL NFR BLD AUTO: 1.7 %
ERYTHROCYTE [DISTWIDTH] IN BLOOD BY AUTOMATED COUNT: 16.6 %
ERYTHROCYTE [SEDIMENTATION RATE] IN BLOOD: 5 MM/HR
GFR SERPLBLD BASED ON 1.73 SQ M-ARVRAT: 43 ML/MIN/1.73M2 (ref 60–?)
GLOBULIN PLAS-MCNC: 3.2 G/DL (ref 2.8–4.4)
GLUCOSE BLD-MCNC: 104 MG/DL (ref 70–99)
HCT VFR BLD AUTO: 45.4 %
HGB BLD-MCNC: 14.4 G/DL
IMM GRANULOCYTES # BLD AUTO: 0.05 X10(3) UL (ref 0–1)
IMM GRANULOCYTES NFR BLD: 0.7 %
LYMPHOCYTES # BLD AUTO: 1.17 X10(3) UL (ref 1–4)
LYMPHOCYTES NFR BLD AUTO: 17 %
MCH RBC QN AUTO: 30.6 PG (ref 26–34)
MCHC RBC AUTO-ENTMCNC: 31.7 G/DL (ref 31–37)
MCV RBC AUTO: 96.6 FL
MONOCYTES # BLD AUTO: 0.37 X10(3) UL (ref 0.1–1)
MONOCYTES NFR BLD AUTO: 5.4 %
NEUTROPHILS # BLD AUTO: 5.16 X10 (3) UL (ref 1.5–7.7)
NEUTROPHILS # BLD AUTO: 5.16 X10(3) UL (ref 1.5–7.7)
NEUTROPHILS NFR BLD AUTO: 75.1 %
OSMOLALITY SERPL CALC.SUM OF ELEC: 293 MOSM/KG (ref 275–295)
PLATELET # BLD AUTO: 172 10(3)UL (ref 150–450)
POTASSIUM SERPL-SCNC: 3.9 MMOL/L (ref 3.5–5.1)
PROT SERPL-MCNC: 6.3 G/DL (ref 6.4–8.2)
RBC # BLD AUTO: 4.7 X10(6)UL
SODIUM SERPL-SCNC: 139 MMOL/L (ref 136–145)
WBC # BLD AUTO: 6.9 X10(3) UL (ref 4–11)

## 2023-05-02 PROCEDURE — 85652 RBC SED RATE AUTOMATED: CPT

## 2023-05-02 PROCEDURE — 86140 C-REACTIVE PROTEIN: CPT

## 2023-05-02 PROCEDURE — 36415 COLL VENOUS BLD VENIPUNCTURE: CPT

## 2023-05-02 PROCEDURE — 85025 COMPLETE CBC W/AUTO DIFF WBC: CPT

## 2023-05-02 PROCEDURE — 80053 COMPREHEN METABOLIC PANEL: CPT

## 2023-05-03 ENCOUNTER — TELEPHONE (OUTPATIENT)
Dept: RHEUMATOLOGY | Facility: CLINIC | Age: 88
End: 2023-05-03

## 2023-05-03 ENCOUNTER — OFFICE VISIT (OUTPATIENT)
Dept: RHEUMATOLOGY | Facility: CLINIC | Age: 88
End: 2023-05-03
Payer: MEDICARE

## 2023-05-03 VITALS
RESPIRATION RATE: 16 BRPM | TEMPERATURE: 97 F | HEIGHT: 62 IN | BODY MASS INDEX: 30.91 KG/M2 | HEART RATE: 72 BPM | OXYGEN SATURATION: 98 % | WEIGHT: 168 LBS

## 2023-05-03 DIAGNOSIS — R70.0 ELEVATED SED RATE: ICD-10-CM

## 2023-05-03 DIAGNOSIS — R79.89 ABNORMAL C-REACTIVE PROTEIN: ICD-10-CM

## 2023-05-03 DIAGNOSIS — Z13.820 SCREENING FOR OSTEOPOROSIS: ICD-10-CM

## 2023-05-03 DIAGNOSIS — M35.3 PMR (POLYMYALGIA RHEUMATICA) (HCC): Primary | ICD-10-CM

## 2023-05-03 DIAGNOSIS — R76.8 CENTROMERE ANTIBODY POSITIVE: ICD-10-CM

## 2023-05-03 DIAGNOSIS — E55.9 VITAMIN D DEFICIENCY: ICD-10-CM

## 2023-05-03 DIAGNOSIS — R76.8 ANA POSITIVE: ICD-10-CM

## 2023-05-03 DIAGNOSIS — Z79.52 LONG TERM (CURRENT) USE OF SYSTEMIC STEROIDS: ICD-10-CM

## 2023-05-03 DIAGNOSIS — R79.89 ABNORMAL BLOOD CREATININE LEVEL: ICD-10-CM

## 2023-05-03 PROCEDURE — 1159F MED LIST DOCD IN RCRD: CPT | Performed by: INTERNAL MEDICINE

## 2023-05-03 PROCEDURE — 3008F BODY MASS INDEX DOCD: CPT | Performed by: INTERNAL MEDICINE

## 2023-05-03 PROCEDURE — 1111F DSCHRG MED/CURRENT MED MERGE: CPT | Performed by: INTERNAL MEDICINE

## 2023-05-03 PROCEDURE — 1160F RVW MEDS BY RX/DR IN RCRD: CPT | Performed by: INTERNAL MEDICINE

## 2023-05-03 PROCEDURE — 99215 OFFICE O/P EST HI 40 MIN: CPT | Performed by: INTERNAL MEDICINE

## 2023-05-03 RX ORDER — PREDNISONE 10 MG/1
TABLET ORAL
Qty: 90 TABLET | Refills: 0 | Status: SHIPPED | OUTPATIENT
Start: 2023-05-03

## 2023-05-03 NOTE — PATIENT INSTRUCTIONS
-- ** clarified dosing over the phone  -- Take 40mg daily x 5 daily, then 30mg daily x 5 days, then 20mg daily x 5 days, then repeat labs when on the 20mg and will reassess dosage for prednisone at that time.    -- continue bactrim MWF  -- continue alendronate once weekly  -- will check vitamin d levels with next blood draw  -- will also monitor kidney function with next blood draw  -- schedule updated bone density when able  -- follow up in 1 months or sooner as needed    Dr. Cristiane Bettencourt

## 2023-05-03 NOTE — TELEPHONE ENCOUNTER
Spoke to pt's daughter Chel Sosa. [facility] pharmacy dispensed the 10mg tablet and has been following dc instructions properly. So will adjust steroid taper now. notified that will adjust taper on AVS so if needing to look back at instructions, will be available on [facility]. Take 40mg daily x 5 daily, then 30mg daily x 5 days, then 20mg daily x 5 days, then repeat labs when on the 20mg and will reassess dosage for prednisone at that time. Patient's daughter verbalized understanding of above instructions. No further questions at this time.     Dominique Roca,   [facility]  5/3/2023

## 2023-05-03 NOTE — TELEPHONE ENCOUNTER
Pharmacy phoned office, requesting clarification for recent prescription of Prednisone. Pt was given Prednisone 10mg tab (qty 270) instead of the 20mg which was electronically prescribed on 4/14/23. Will dispense as written.

## 2023-05-04 PROBLEM — H93.8X2 CONGESTION OF LEFT EAR: Status: ACTIVE | Noted: 2023-05-04

## 2023-05-04 PROBLEM — R07.89 OTHER CHEST PAIN: Status: ACTIVE | Noted: 2023-04-19

## 2023-05-04 PROBLEM — R60.0 BILATERAL LOWER EXTREMITY EDEMA: Status: ACTIVE | Noted: 2023-05-04

## 2023-05-05 ENCOUNTER — TELEPHONE (OUTPATIENT)
Dept: RHEUMATOLOGY | Facility: CLINIC | Age: 88
End: 2023-05-05

## 2023-05-05 NOTE — TELEPHONE ENCOUNTER
Instructed daughter with previous call this am to notify patient PCP of pt chest pain episode. voiced understanding. Called pt and daughter, LVM with instructions and to call our office to let us know that they received our message.

## 2023-05-05 NOTE — TELEPHONE ENCOUNTER
Pt daughter called office, pt experienced another episode of chest pain this morning approx 10min after alendronate. Pt was given 4 chewable Asprin and chest pain now resolved. Denies any SOB or other symptoms. Pt currently at 40mg prednisone day 3.

## 2023-05-05 NOTE — TELEPHONE ENCOUNTER
Patient daughter called back. Spoke with her and advised to stop alendronate as below. Also reinforced  Take 40mg daily x 5 daily, then 30mg daily x 5 days, then 20mg daily x 5 days, then repeat labs when on the 20mg and will reassess dosage for prednisone at that time. As per LOV note.

## 2023-05-17 ENCOUNTER — LAB ENCOUNTER (OUTPATIENT)
Dept: LAB | Age: 88
End: 2023-05-17
Attending: INTERNAL MEDICINE
Payer: MEDICARE

## 2023-05-17 DIAGNOSIS — R79.89 ABNORMAL C-REACTIVE PROTEIN: ICD-10-CM

## 2023-05-17 DIAGNOSIS — R70.0 ELEVATED SED RATE: ICD-10-CM

## 2023-05-17 DIAGNOSIS — M35.3 PMR (POLYMYALGIA RHEUMATICA) (HCC): ICD-10-CM

## 2023-05-17 DIAGNOSIS — R79.89 ABNORMAL BLOOD CREATININE LEVEL: ICD-10-CM

## 2023-05-17 DIAGNOSIS — E55.9 VITAMIN D DEFICIENCY: ICD-10-CM

## 2023-05-17 LAB
ANION GAP SERPL CALC-SCNC: 4 MMOL/L (ref 0–18)
BUN BLD-MCNC: 21 MG/DL (ref 7–18)
CALCIUM BLD-MCNC: 8.8 MG/DL (ref 8.5–10.1)
CHLORIDE SERPL-SCNC: 111 MMOL/L (ref 98–112)
CO2 SERPL-SCNC: 27 MMOL/L (ref 21–32)
CREAT BLD-MCNC: 1.17 MG/DL
CRP SERPL-MCNC: 0.5 MG/DL (ref ?–0.3)
ERYTHROCYTE [SEDIMENTATION RATE] IN BLOOD: 5 MM/HR
FASTING STATUS PATIENT QL REPORTED: YES
GFR SERPLBLD BASED ON 1.73 SQ M-ARVRAT: 44 ML/MIN/1.73M2 (ref 60–?)
GLUCOSE BLD-MCNC: 95 MG/DL (ref 70–99)
OSMOLALITY SERPL CALC.SUM OF ELEC: 297 MOSM/KG (ref 275–295)
POTASSIUM SERPL-SCNC: 4.2 MMOL/L (ref 3.5–5.1)
SODIUM SERPL-SCNC: 142 MMOL/L (ref 136–145)
VIT D+METAB SERPL-MCNC: 46.2 NG/ML (ref 30–100)

## 2023-05-17 PROCEDURE — 36415 COLL VENOUS BLD VENIPUNCTURE: CPT

## 2023-05-17 PROCEDURE — 82306 VITAMIN D 25 HYDROXY: CPT

## 2023-05-17 PROCEDURE — 85652 RBC SED RATE AUTOMATED: CPT

## 2023-05-17 PROCEDURE — 86140 C-REACTIVE PROTEIN: CPT

## 2023-05-17 PROCEDURE — 80048 BASIC METABOLIC PNL TOTAL CA: CPT

## 2023-05-18 DIAGNOSIS — M35.3 PMR (POLYMYALGIA RHEUMATICA) (HCC): Primary | ICD-10-CM

## 2023-05-18 RX ORDER — PREDNISONE 5 MG/1
TABLET ORAL
Qty: 180 TABLET | Refills: 0 | Status: SHIPPED | OUTPATIENT
Start: 2023-05-18

## 2023-05-19 ENCOUNTER — TELEPHONE (OUTPATIENT)
Dept: RHEUMATOLOGY | Facility: CLINIC | Age: 88
End: 2023-05-19

## 2023-05-19 NOTE — TELEPHONE ENCOUNTER
Reviewed Med Rec Looks like medications below were changed     1. furosemide 20mg for 7 days   2. prednisone 5mg Take 25mg daily x 7 days, then 20mg daily x 7 days, then 15mg daily     Secure chat sent to provider to clarify if these new meds and dosages are to be started immediately.

## 2023-05-19 NOTE — TELEPHONE ENCOUNTER
Called patient's daughter again. Reiterated instructions to start 25 mg daily starting today for 7 days then 20 mg daily x7 days then 15 mg. Patient's daughter states that she was confused about instructions and that 2 days ago was her last day of 20 mg and so yesterday she gave the patient 15 mg. She has been having rash over the lower abdomen, I stressed again that this is hard for me to diagnose over the phone. She really needs to be seen in person by a PCP versus urgent care versus a dermatologist.  Discussed that differential is wide given her history of eczema versus shingles due to the current immunosuppression. I recommended against topical steroids for now until rash has been evaluated by medical provider. Also reiterated that 7-day prescription sent for Lasix until patient can be seen by new PCP. Patient's daughter verbalized understanding of above instructions. No further questions at this time.     Rehana Nunez DO  [facility]  5/19/2023

## 2023-05-22 ENCOUNTER — TELEPHONE (OUTPATIENT)
Dept: RHEUMATOLOGY | Facility: CLINIC | Age: 88
End: 2023-05-22

## 2023-05-22 NOTE — TELEPHONE ENCOUNTER
Pt daughter phoned office, pt went to  over the weekend --dx with Shingles to groin area. Pt taking prednisone 25mg daily. Having co heaviness to her legs, but was feeling better overall yesterday. Wanted to call our office with update.

## 2023-05-25 ENCOUNTER — OFFICE VISIT (OUTPATIENT)
Dept: FAMILY MEDICINE CLINIC | Facility: CLINIC | Age: 88
End: 2023-05-25
Payer: MEDICARE

## 2023-05-25 VITALS
BODY MASS INDEX: 30.36 KG/M2 | HEART RATE: 73 BPM | OXYGEN SATURATION: 96 % | DIASTOLIC BLOOD PRESSURE: 70 MMHG | WEIGHT: 165 LBS | HEIGHT: 62 IN | RESPIRATION RATE: 16 BRPM | SYSTOLIC BLOOD PRESSURE: 108 MMHG

## 2023-05-25 DIAGNOSIS — R60.0 BILATERAL LOWER EXTREMITY EDEMA: ICD-10-CM

## 2023-05-25 DIAGNOSIS — I25.10 CORONARY ARTERY DISEASE INVOLVING NATIVE CORONARY ARTERY OF NATIVE HEART WITHOUT ANGINA PECTORIS: ICD-10-CM

## 2023-05-25 DIAGNOSIS — R73.9 HYPERGLYCEMIA: ICD-10-CM

## 2023-05-25 DIAGNOSIS — I35.1 AORTIC VALVE INSUFFICIENCY, ETIOLOGY OF CARDIAC VALVE DISEASE UNSPECIFIED: ICD-10-CM

## 2023-05-25 DIAGNOSIS — I48.91 ATRIAL FIBRILLATION WITH RVR (HCC): Primary | ICD-10-CM

## 2023-05-25 DIAGNOSIS — M81.0 AGE-RELATED OSTEOPOROSIS WITHOUT CURRENT PATHOLOGICAL FRACTURE: ICD-10-CM

## 2023-05-25 DIAGNOSIS — N18.31 STAGE 3A CHRONIC KIDNEY DISEASE (HCC): ICD-10-CM

## 2023-05-25 DIAGNOSIS — M35.3 PMR (POLYMYALGIA RHEUMATICA) (HCC): ICD-10-CM

## 2023-05-25 DIAGNOSIS — B02.9 HERPES ZOSTER WITHOUT COMPLICATION: ICD-10-CM

## 2023-05-25 DIAGNOSIS — F32.A DEPRESSION, UNSPECIFIED DEPRESSION TYPE: ICD-10-CM

## 2023-05-25 DIAGNOSIS — H40.9 GLAUCOMA OF BOTH EYES, UNSPECIFIED GLAUCOMA TYPE: ICD-10-CM

## 2023-05-25 DIAGNOSIS — E03.9 HYPOTHYROIDISM, UNSPECIFIED TYPE: ICD-10-CM

## 2023-05-25 DIAGNOSIS — Z91.89 AT RISK FOR INFECTION DUE TO IMMUNOSUPPRESSION: ICD-10-CM

## 2023-05-25 PROCEDURE — 3074F SYST BP LT 130 MM HG: CPT | Performed by: FAMILY MEDICINE

## 2023-05-25 PROCEDURE — 1159F MED LIST DOCD IN RCRD: CPT | Performed by: FAMILY MEDICINE

## 2023-05-25 PROCEDURE — 1111F DSCHRG MED/CURRENT MED MERGE: CPT | Performed by: FAMILY MEDICINE

## 2023-05-25 PROCEDURE — 1160F RVW MEDS BY RX/DR IN RCRD: CPT | Performed by: FAMILY MEDICINE

## 2023-05-25 PROCEDURE — 99204 OFFICE O/P NEW MOD 45 MIN: CPT | Performed by: FAMILY MEDICINE

## 2023-05-25 PROCEDURE — 3078F DIAST BP <80 MM HG: CPT | Performed by: FAMILY MEDICINE

## 2023-05-25 PROCEDURE — 3008F BODY MASS INDEX DOCD: CPT | Performed by: FAMILY MEDICINE

## 2023-05-25 RX ORDER — ESCITALOPRAM OXALATE 5 MG/1
5 TABLET ORAL DAILY
Qty: 90 TABLET | Refills: 1 | Status: SHIPPED | OUTPATIENT
Start: 2023-05-25

## 2023-05-25 RX ORDER — VALACYCLOVIR HYDROCHLORIDE 1 G/1
1 TABLET, FILM COATED ORAL 2 TIMES DAILY
COMMUNITY
Start: 2023-05-21 | End: 2023-06-01 | Stop reason: DRUGHIGH

## 2023-05-25 RX ORDER — GABAPENTIN 100 MG/1
1 CAPSULE ORAL AS NEEDED
COMMUNITY
Start: 2023-05-21

## 2023-05-25 RX ORDER — FUROSEMIDE 20 MG/1
20 TABLET ORAL DAILY
Qty: 30 TABLET | Refills: 2 | Status: SHIPPED | OUTPATIENT
Start: 2023-05-25 | End: 2023-06-24

## 2023-05-25 RX ORDER — ATORVASTATIN CALCIUM 40 MG/1
40 TABLET, FILM COATED ORAL NIGHTLY
Qty: 90 TABLET | Refills: 1 | Status: SHIPPED | OUTPATIENT
Start: 2023-05-25

## 2023-05-25 RX ORDER — LEVOTHYROXINE SODIUM 0.07 MG/1
75 TABLET ORAL DAILY
Qty: 90 TABLET | Refills: 1 | Status: SHIPPED | OUTPATIENT
Start: 2023-05-25

## 2023-06-01 ENCOUNTER — TELEPHONE (OUTPATIENT)
Dept: RHEUMATOLOGY | Facility: CLINIC | Age: 88
End: 2023-06-01

## 2023-06-01 RX ORDER — VALACYCLOVIR HYDROCHLORIDE 500 MG/1
500 TABLET, FILM COATED ORAL 2 TIMES DAILY
Qty: 14 TABLET | Refills: 0 | Status: SHIPPED | OUTPATIENT
Start: 2023-06-01

## 2023-06-01 NOTE — TELEPHONE ENCOUNTER
Left message on answering machine for Raphael Wil. She is tapering her prednisone was on 20 now at 15 mg a day for PMR told to stay on 15 mg a day for 3 to 4 days if feeling well from a PMR standpoint cut down to 10 mg a day which would be next week and then talk with Dr. Shaniqua Brewster her primary rheumatologist.  Regarding shingles if she still has oozing lesions and I recommend another week of Valtrex treatment for 500 mg twice a day I will call that into the pharmacy. With oozing lesion she still be contagious has to be careful.   Dr. George Roberts

## 2023-06-01 NOTE — TELEPHONE ENCOUNTER
Spoke with [place], patients daughter. The did not hear back regarding the dosing of prednisone on 5/30/23. She decreased her from 20mg to 15mg for yesterday and today. Wanted to make sure this is okay with provider. She is having some swelling in her face from prednisone. Her PMR is \"better than when she was in the hospital, but definitely still there. \" Not much in the way of pain, but swollen feet and heavy legs. Her main complaint right now is the shingles. They were previously informed they can be contagous and she has been mostly isolated as she lives around her great grandchildren. The lesions have started oozing. Some appear to be healing, others crusted, but there are several actively oozing. States she completed the valcyclovir as prescribed and has 3 tablets left of gabapentin. Has had been feeling more sad and lonely than normal due to isolation, also thinks it could be the prednisone. Would like to eventually get off it. Would like next steps on dosing. Needs some direction today. Please advise. Future Appointments   Date Time Provider Jc Pruitt   6/13/2023 10:30 AM Molly Moreno, DO [facility]   8/3/2023  1:30 PM Lilliam Moreno, DO [facility]   10/2/2023 11:15 AM Lilliam Moreno, DO [facility] [address]       Dr. Winnie Suazo currently out of office. Sent to on-call provider.

## 2023-06-05 NOTE — TELEPHONE ENCOUNTER
Spoke to pt daughter, instructed to get updated labs by the end of the week. Some of the swelling could be from prednisone but could be due to fluid retention which she had when she came to the hospital. Be sure PCP is aware and they are also monitoring her BP. Reiterated  Dr. Sade Alan recommendations about the shingles- if still oozing, pt would need longer course of valtrex. Voices understanding, pt started 2nd course of Valtrex but all lesions have dried up.

## 2023-06-05 NOTE — TELEPHONE ENCOUNTER
Spoke with pt daughter Nely Sierra, pt currently at 15mg prednisone and will be decreasing to 10mg tomorrow. Pt feeling ok, shingle lesions have dried up. Pt face and feet are still pretty swollen and would like Dr. Chuck West to be aware of condition update.

## 2023-06-07 ENCOUNTER — TELEPHONE (OUTPATIENT)
Dept: FAMILY MEDICINE CLINIC | Facility: CLINIC | Age: 88
End: 2023-06-07

## 2023-06-07 DIAGNOSIS — R60.9 SWELLING: ICD-10-CM

## 2023-06-07 DIAGNOSIS — I48.91 ATRIAL FIBRILLATION WITH RVR (HCC): Primary | ICD-10-CM

## 2023-06-07 NOTE — TELEPHONE ENCOUNTER
Called pt's daughter to obtain additional information. Daughter states pt has swelling to both feet with left foot swelling a little worse than right foot. Daughter also states pt is having facial swelling and pt feels like swelling is in abdomen. Pt on prednisone 10 mg that has been being tapered down. Denies sob and chest pain. Daughter asking if any additional labs are needed. ER precautions provided. Appt scheduled for 06/08/23 at 1600. Please advise. Thank you.    LOV: 05/25/23

## 2023-06-07 NOTE — TELEPHONE ENCOUNTER
Patient is experiencing swelling with both feet (for awhile now) and now start having facial swelling for about 1 week. Wanted to speak to a nurse whether her mom needs to do labs?      Please Advise

## 2023-06-07 NOTE — TELEPHONE ENCOUNTER
Called pt's daughter and informed her of below response from provider. Questions answered and daughter verbalized understanding.

## 2023-06-08 ENCOUNTER — LAB ENCOUNTER (OUTPATIENT)
Dept: LAB | Age: 88
End: 2023-06-08
Attending: FAMILY MEDICINE
Payer: MEDICARE

## 2023-06-08 ENCOUNTER — OFFICE VISIT (OUTPATIENT)
Dept: FAMILY MEDICINE CLINIC | Facility: CLINIC | Age: 88
End: 2023-06-08
Payer: MEDICARE

## 2023-06-08 VITALS
SYSTOLIC BLOOD PRESSURE: 106 MMHG | OXYGEN SATURATION: 97 % | BODY MASS INDEX: 30.36 KG/M2 | HEIGHT: 62 IN | WEIGHT: 165 LBS | RESPIRATION RATE: 16 BRPM | HEART RATE: 72 BPM | DIASTOLIC BLOOD PRESSURE: 70 MMHG

## 2023-06-08 DIAGNOSIS — R60.0 BILATERAL LOWER EXTREMITY EDEMA: ICD-10-CM

## 2023-06-08 DIAGNOSIS — R60.9 PERIPHERAL EDEMA: Primary | ICD-10-CM

## 2023-06-08 DIAGNOSIS — R79.89 ABNORMAL C-REACTIVE PROTEIN: ICD-10-CM

## 2023-06-08 DIAGNOSIS — E03.9 HYPOTHYROIDISM, UNSPECIFIED TYPE: ICD-10-CM

## 2023-06-08 DIAGNOSIS — L60.0 ONYCHOCRYPTOSIS: ICD-10-CM

## 2023-06-08 DIAGNOSIS — R60.9 SWELLING: ICD-10-CM

## 2023-06-08 DIAGNOSIS — I48.91 ATRIAL FIBRILLATION WITH RVR (HCC): ICD-10-CM

## 2023-06-08 DIAGNOSIS — N18.31 STAGE 3A CHRONIC KIDNEY DISEASE (HCC): ICD-10-CM

## 2023-06-08 DIAGNOSIS — R70.0 ELEVATED SED RATE: ICD-10-CM

## 2023-06-08 DIAGNOSIS — R73.9 HYPERGLYCEMIA: ICD-10-CM

## 2023-06-08 DIAGNOSIS — M35.3 PMR (POLYMYALGIA RHEUMATICA) (HCC): ICD-10-CM

## 2023-06-08 DIAGNOSIS — R07.9 CHEST PAIN, UNSPECIFIED TYPE: ICD-10-CM

## 2023-06-08 LAB
ALBUMIN SERPL-MCNC: 3.2 G/DL (ref 3.4–5)
ALBUMIN/GLOB SERPL: 1.2 {RATIO} (ref 1–2)
ALP LIVER SERPL-CCNC: 61 U/L
ALT SERPL-CCNC: 44 U/L
ANION GAP SERPL CALC-SCNC: 4 MMOL/L (ref 0–18)
AST SERPL-CCNC: 30 U/L (ref 15–37)
BILIRUB SERPL-MCNC: 1.1 MG/DL (ref 0.1–2)
BUN BLD-MCNC: 16 MG/DL (ref 7–18)
CALCIUM BLD-MCNC: 8.7 MG/DL (ref 8.5–10.1)
CHLORIDE SERPL-SCNC: 115 MMOL/L (ref 98–112)
CO2 SERPL-SCNC: 26 MMOL/L (ref 21–32)
CREAT BLD-MCNC: 1.07 MG/DL
CREAT UR-SCNC: 215 MG/DL
CRP SERPL-MCNC: 0.32 MG/DL (ref ?–0.3)
ERYTHROCYTE [SEDIMENTATION RATE] IN BLOOD: 5 MM/HR
EST. AVERAGE GLUCOSE BLD GHB EST-MCNC: 148 MG/DL (ref 68–126)
FASTING STATUS PATIENT QL REPORTED: YES
GFR SERPLBLD BASED ON 1.73 SQ M-ARVRAT: 49 ML/MIN/1.73M2 (ref 60–?)
GLOBULIN PLAS-MCNC: 2.7 G/DL (ref 2.8–4.4)
GLUCOSE BLD-MCNC: 92 MG/DL (ref 70–99)
HBA1C MFR BLD: 6.8 % (ref ?–5.7)
MICROALBUMIN UR-MCNC: 1.74 MG/DL
MICROALBUMIN/CREAT 24H UR-RTO: 8.1 UG/MG (ref ?–30)
NT-PROBNP SERPL-MCNC: 2594 PG/ML (ref ?–450)
OSMOLALITY SERPL CALC.SUM OF ELEC: 301 MOSM/KG (ref 275–295)
POTASSIUM SERPL-SCNC: 3.6 MMOL/L (ref 3.5–5.1)
PROT SERPL-MCNC: 5.9 G/DL (ref 6.4–8.2)
SODIUM SERPL-SCNC: 145 MMOL/L (ref 136–145)
TSI SER-ACNC: 1.35 MIU/ML (ref 0.36–3.74)

## 2023-06-08 PROCEDURE — 93000 ELECTROCARDIOGRAM COMPLETE: CPT | Performed by: FAMILY MEDICINE

## 2023-06-08 PROCEDURE — 3074F SYST BP LT 130 MM HG: CPT | Performed by: FAMILY MEDICINE

## 2023-06-08 PROCEDURE — 36415 COLL VENOUS BLD VENIPUNCTURE: CPT

## 2023-06-08 PROCEDURE — 3008F BODY MASS INDEX DOCD: CPT | Performed by: FAMILY MEDICINE

## 2023-06-08 PROCEDURE — 99213 OFFICE O/P EST LOW 20 MIN: CPT | Performed by: FAMILY MEDICINE

## 2023-06-08 PROCEDURE — 82570 ASSAY OF URINE CREATININE: CPT

## 2023-06-08 PROCEDURE — 80053 COMPREHEN METABOLIC PANEL: CPT

## 2023-06-08 PROCEDURE — 83036 HEMOGLOBIN GLYCOSYLATED A1C: CPT

## 2023-06-08 PROCEDURE — 83880 ASSAY OF NATRIURETIC PEPTIDE: CPT

## 2023-06-08 PROCEDURE — 82043 UR ALBUMIN QUANTITATIVE: CPT

## 2023-06-08 PROCEDURE — 84443 ASSAY THYROID STIM HORMONE: CPT

## 2023-06-08 PROCEDURE — 1159F MED LIST DOCD IN RCRD: CPT | Performed by: FAMILY MEDICINE

## 2023-06-08 PROCEDURE — 85652 RBC SED RATE AUTOMATED: CPT

## 2023-06-08 PROCEDURE — 86140 C-REACTIVE PROTEIN: CPT

## 2023-06-08 PROCEDURE — 1160F RVW MEDS BY RX/DR IN RCRD: CPT | Performed by: FAMILY MEDICINE

## 2023-06-08 PROCEDURE — 3078F DIAST BP <80 MM HG: CPT | Performed by: FAMILY MEDICINE

## 2023-06-08 NOTE — PATIENT INSTRUCTIONS
We are awaiting blood work that was done today. For the next 3 days you can increase your furosemide (water pill) to 40mg daily for 3 days. Do not administer additional dose if blood pressure is <100/60. After 3 days you can resume your normal dosing (furosemide 20mg daily). Follow up with cardiologist regarding your swelling and chest pain. Present to ER if having any continued symptoms.

## 2023-06-09 LAB
ATRIAL RATE: 208 BPM
Q-T INTERVAL: 394 MS
QRS DURATION: 84 MS
QTC CALCULATION (BEZET): 437 MS
R AXIS: -28 DEGREES
T AXIS: 177 DEGREES
VENTRICULAR RATE: 74 BPM

## 2023-06-12 ENCOUNTER — TELEPHONE (OUTPATIENT)
Dept: FAMILY MEDICINE CLINIC | Facility: CLINIC | Age: 88
End: 2023-06-12

## 2023-06-12 NOTE — TELEPHONE ENCOUNTER
----- Message from Marilyn Gerber MD sent at 6/9/2023 12:03 PM CDT -----  BNP very elevated. Given her swelling, she might need IV diuresis and hospitalization. I did advise to follow up with cardiologist today; can we get a condition update?

## 2023-06-12 NOTE — TELEPHONE ENCOUNTER
Called pt's daughter, [place] who states pt's swelling has gone down. Pt denies chest pain. Daughter states she has not called cardiologist and then stated they will not do anything for pt because of her age. Informed daughter of pcp's recommendation and daughter states she will inform pt but daughter does not think pt will go to ER. Daughter then stated she will call cardiologist. Please advise. Thank you.    LOV: 06/08/23

## 2023-06-12 NOTE — TELEPHONE ENCOUNTER
Called pt's daughter and was informed of below response from provider. Daughter verbalized understanding.

## 2023-06-13 ENCOUNTER — APPOINTMENT (OUTPATIENT)
Dept: GENERAL RADIOLOGY | Facility: HOSPITAL | Age: 88
End: 2023-06-13
Attending: EMERGENCY MEDICINE
Payer: MEDICARE

## 2023-06-13 ENCOUNTER — HOSPITAL ENCOUNTER (EMERGENCY)
Facility: HOSPITAL | Age: 88
Discharge: HOME | End: 2023-06-13
Attending: EMERGENCY MEDICINE
Payer: MEDICARE

## 2023-06-13 ENCOUNTER — APPOINTMENT (OUTPATIENT)
Dept: CT IMAGING | Facility: HOSPITAL | Age: 88
End: 2023-06-13
Attending: EMERGENCY MEDICINE
Payer: MEDICARE

## 2023-06-13 ENCOUNTER — APPOINTMENT (OUTPATIENT)
Dept: MRI IMAGING | Facility: HOSPITAL | Age: 88
End: 2023-06-13
Attending: EMERGENCY MEDICINE
Payer: MEDICARE

## 2023-06-13 VITALS
HEART RATE: 79 BPM | RESPIRATION RATE: 16 BRPM | TEMPERATURE: 98 F | SYSTOLIC BLOOD PRESSURE: 137 MMHG | OXYGEN SATURATION: 95 % | DIASTOLIC BLOOD PRESSURE: 85 MMHG

## 2023-06-13 DIAGNOSIS — R42 VERTIGO: Primary | ICD-10-CM

## 2023-06-13 LAB
ALBUMIN SERPL-MCNC: 3.1 G/DL (ref 3.4–5)
ALBUMIN/GLOB SERPL: 1.1 {RATIO} (ref 1–2)
ALP LIVER SERPL-CCNC: 60 U/L
ALT SERPL-CCNC: 39 U/L
ANION GAP SERPL CALC-SCNC: 6 MMOL/L (ref 0–18)
AST SERPL-CCNC: 33 U/L (ref 15–37)
BASOPHILS # BLD AUTO: 0.05 X10(3) UL (ref 0–0.2)
BASOPHILS NFR BLD AUTO: 1 %
BILIRUB SERPL-MCNC: 1 MG/DL (ref 0.1–2)
BILIRUB UR QL STRIP.AUTO: NEGATIVE
BUN BLD-MCNC: 17 MG/DL (ref 7–18)
CALCIUM BLD-MCNC: 8.9 MG/DL (ref 8.5–10.1)
CHLORIDE SERPL-SCNC: 112 MMOL/L (ref 98–112)
CLARITY UR REFRACT.AUTO: CLEAR
CO2 SERPL-SCNC: 26 MMOL/L (ref 21–32)
COLOR UR AUTO: YELLOW
CREAT BLD-MCNC: 0.98 MG/DL
EOSINOPHIL # BLD AUTO: 0.13 X10(3) UL (ref 0–0.7)
EOSINOPHIL NFR BLD AUTO: 2.5 %
ERYTHROCYTE [DISTWIDTH] IN BLOOD BY AUTOMATED COUNT: 18.6 %
GFR SERPLBLD BASED ON 1.73 SQ M-ARVRAT: 55 ML/MIN/1.73M2 (ref 60–?)
GLOBULIN PLAS-MCNC: 2.9 G/DL (ref 2.8–4.4)
GLUCOSE BLD-MCNC: 90 MG/DL (ref 70–99)
GLUCOSE UR STRIP.AUTO-MCNC: NEGATIVE MG/DL
HCT VFR BLD AUTO: 39.9 %
HGB BLD-MCNC: 12.8 G/DL
IMM GRANULOCYTES # BLD AUTO: 0.05 X10(3) UL (ref 0–1)
IMM GRANULOCYTES NFR BLD: 1 %
LYMPHOCYTES # BLD AUTO: 1.22 X10(3) UL (ref 1–4)
LYMPHOCYTES NFR BLD AUTO: 23.6 %
MCH RBC QN AUTO: 31.6 PG (ref 26–34)
MCHC RBC AUTO-ENTMCNC: 32.1 G/DL (ref 31–37)
MCV RBC AUTO: 98.5 FL
MONOCYTES # BLD AUTO: 0.47 X10(3) UL (ref 0.1–1)
MONOCYTES NFR BLD AUTO: 9.1 %
NEUTROPHILS # BLD AUTO: 3.25 X10 (3) UL (ref 1.5–7.7)
NEUTROPHILS # BLD AUTO: 3.25 X10(3) UL (ref 1.5–7.7)
NEUTROPHILS NFR BLD AUTO: 62.8 %
NITRITE UR QL STRIP.AUTO: NEGATIVE
OSMOLALITY SERPL CALC.SUM OF ELEC: 299 MOSM/KG (ref 275–295)
PH UR STRIP.AUTO: 6 [PH] (ref 5–8)
PLATELET # BLD AUTO: 196 10(3)UL (ref 150–450)
POTASSIUM SERPL-SCNC: 3.5 MMOL/L (ref 3.5–5.1)
PROT SERPL-MCNC: 6 G/DL (ref 6.4–8.2)
PROT UR STRIP.AUTO-MCNC: NEGATIVE MG/DL
Q-T INTERVAL: 406 MS
QRS DURATION: 86 MS
QTC CALCULATION (BEZET): 418 MS
R AXIS: -29 DEGREES
RBC # BLD AUTO: 4.05 X10(6)UL
RBC UR QL AUTO: NEGATIVE
SODIUM SERPL-SCNC: 144 MMOL/L (ref 136–145)
SP GR UR STRIP.AUTO: 1.01 (ref 1–1.03)
T AXIS: 166 DEGREES
TROPONIN I HIGH SENSITIVITY: 29 NG/L
UROBILINOGEN UR STRIP.AUTO-MCNC: 4 MG/DL
VENTRICULAR RATE: 64 BPM
WBC # BLD AUTO: 5.2 X10(3) UL (ref 4–11)

## 2023-06-13 PROCEDURE — 36415 COLL VENOUS BLD VENIPUNCTURE: CPT

## 2023-06-13 PROCEDURE — 93005 ELECTROCARDIOGRAM TRACING: CPT

## 2023-06-13 PROCEDURE — 80053 COMPREHEN METABOLIC PANEL: CPT | Performed by: EMERGENCY MEDICINE

## 2023-06-13 PROCEDURE — 93010 ELECTROCARDIOGRAM REPORT: CPT

## 2023-06-13 PROCEDURE — 99285 EMERGENCY DEPT VISIT HI MDM: CPT

## 2023-06-13 PROCEDURE — 70551 MRI BRAIN STEM W/O DYE: CPT | Performed by: EMERGENCY MEDICINE

## 2023-06-13 PROCEDURE — 87086 URINE CULTURE/COLONY COUNT: CPT | Performed by: EMERGENCY MEDICINE

## 2023-06-13 PROCEDURE — 71045 X-RAY EXAM CHEST 1 VIEW: CPT | Performed by: EMERGENCY MEDICINE

## 2023-06-13 PROCEDURE — 70450 CT HEAD/BRAIN W/O DYE: CPT | Performed by: EMERGENCY MEDICINE

## 2023-06-13 PROCEDURE — 85025 COMPLETE CBC W/AUTO DIFF WBC: CPT | Performed by: EMERGENCY MEDICINE

## 2023-06-13 PROCEDURE — 84484 ASSAY OF TROPONIN QUANT: CPT | Performed by: EMERGENCY MEDICINE

## 2023-06-13 PROCEDURE — 81001 URINALYSIS AUTO W/SCOPE: CPT | Performed by: EMERGENCY MEDICINE

## 2023-06-13 RX ORDER — LORAZEPAM 1 MG/1
0.5 TABLET ORAL ONCE
Status: COMPLETED | OUTPATIENT
Start: 2023-06-13 | End: 2023-06-13

## 2023-06-13 RX ORDER — MECLIZINE HYDROCHLORIDE 25 MG/1
12.5 TABLET ORAL 3 TIMES DAILY PRN
Qty: 10 TABLET | Refills: 0 | Status: SHIPPED | OUTPATIENT
Start: 2023-06-13 | End: 2023-06-20

## 2023-06-13 NOTE — ED QUICK NOTES
Pt awake and alert, skin w/d,resps reg/unlabored. Pt states she is feeling better. Pt with steady gait. Pt lives at home with family. Pt left with belongings via wheelchair.

## 2023-06-13 NOTE — ED QUICK NOTES
Pt back from MRI without incident. Pt awake and alert, skin w/d,resps reg/unlabored. Pt states that her dizziness has subsided. Family at bedside. Pt aware in need of urine sample. Pt c/o discomfort to right arm from IV. Tape removed and catheter kinked. Able to clean, straighten and apply new tegaderm with gauze and pt reported improvement in discomfort. IV able to flush easily and with good blood return. Pt requesting blood pressure only be taken on forearm. Family at bedside.

## 2023-06-13 NOTE — ED QUICK NOTES
Pt ambulated to restroom without incidence. Pt states she feels better and less dizzy. Gait steady and even with assistance of cane. Pt vitals updated.

## 2023-06-13 NOTE — ED INITIAL ASSESSMENT (HPI)
Pt arrives to ED with c/o the room spinning that started last night. Pt also had an episode of chest pain on Thursday.

## 2023-06-16 ENCOUNTER — TELEPHONE (OUTPATIENT)
Dept: FAMILY MEDICINE CLINIC | Facility: CLINIC | Age: 88
End: 2023-06-16

## 2023-06-16 NOTE — TELEPHONE ENCOUNTER
Spoke w/ pt. She is feeling better as far as the dizziness. Leg swelling improved some. She is feeling weak and tired. They are going to follow up with cardiology, will be scheduling with . I explained that her symptoms of tired/weak are probably from heart failure. Can try cutting back on meclizine to see if that helps symptoms since dizziness is resolving. I did offer to schedule follow up appt here. They will schedule with cardiology first and then here.

## 2023-06-16 NOTE — TELEPHONE ENCOUNTER
Can we get a condition update on patient? She was recently seen in ER for vertigo earlier this week and she has been dealing with lower extremity swelling.

## 2023-07-07 ENCOUNTER — TELEPHONE (OUTPATIENT)
Dept: FAMILY MEDICINE CLINIC | Facility: CLINIC | Age: 88
End: 2023-07-07

## 2023-07-07 NOTE — TELEPHONE ENCOUNTER
Pt's Dtr requesting a call back from PCP directly as she has a question about her mother. Unwilling to provide question or offer insight as to matter of question, \"Just a question about my mother and I'd like a call back from Dr. Nancy Alford directly\".  LOV 6/8/23

## 2023-07-07 NOTE — TELEPHONE ENCOUNTER
Patient's daughter called requesting to speak Dr. Julio C Chandra, I asked the daughter what's the reason so I can put in the message but she said its a personal question and wouldn't say. Patient's daughter would like a call back.

## 2023-07-10 NOTE — TELEPHONE ENCOUNTER
Eligibility    Patient is Eligible for coverage  Alerts    Subscriber Primary Care Physician is: Christal Burns  General Benefit In-network Individual Deductible Total: 0  General Benefit In-network Individual Deductible Remainin  Medical Group IPA: [facility]  Eligibility Response Indicates TeleHealth Benefits    Spoke to pt's daughter and explained this whole process about having to contact the insurance and give them the changes to Dr Jewell Villatoro. Along with our  number. She states she will give them a call. She said the reason she asked for Dr Jewell Villatoro specifically was because the insurance kept telling her that she needed to speak to the doctor. She said her Mom really likes Dr Jewell Villatoro and [facility] and wants to stay with the system.   She will call back if any further questions

## 2023-07-10 NOTE — TELEPHONE ENCOUNTER
Discussed with daughter; she reports that she got a call saying that we don't cover her insurance? Can we look into this and call her back regarding this?

## 2023-07-26 ENCOUNTER — APPOINTMENT (OUTPATIENT)
Dept: ULTRASOUND IMAGING | Facility: HOSPITAL | Age: 88
End: 2023-07-26
Attending: EMERGENCY MEDICINE
Payer: MEDICARE

## 2023-07-26 ENCOUNTER — HOSPITAL ENCOUNTER (INPATIENT)
Facility: HOSPITAL | Age: 88
LOS: 2 days | Discharge: HOME | End: 2023-07-28
Attending: EMERGENCY MEDICINE | Admitting: HOSPITALIST
Payer: MEDICARE

## 2023-07-26 ENCOUNTER — APPOINTMENT (OUTPATIENT)
Dept: GENERAL RADIOLOGY | Facility: HOSPITAL | Age: 88
End: 2023-07-26
Attending: EMERGENCY MEDICINE
Payer: MEDICARE

## 2023-07-26 DIAGNOSIS — R60.0 BILATERAL LOWER EXTREMITY EDEMA: ICD-10-CM

## 2023-07-26 DIAGNOSIS — I50.9 ACUTE ON CHRONIC CONGESTIVE HEART FAILURE, UNSPECIFIED HEART FAILURE TYPE (HCC): Primary | ICD-10-CM

## 2023-07-26 PROBLEM — R10.32 LEFT GROIN PAIN: Status: ACTIVE | Noted: 2023-07-26

## 2023-07-26 LAB
ALBUMIN SERPL-MCNC: 2.5 G/DL (ref 3.4–5)
ALBUMIN/GLOB SERPL: 0.6 {RATIO} (ref 1–2)
ALP LIVER SERPL-CCNC: 76 U/L
ALT SERPL-CCNC: 21 U/L
ANION GAP SERPL CALC-SCNC: 7 MMOL/L (ref 0–18)
APTT PPP: 35.7 SECONDS (ref 23.3–35.6)
AST SERPL-CCNC: 18 U/L (ref 15–37)
BASOPHILS # BLD AUTO: 0.03 X10(3) UL (ref 0–0.2)
BASOPHILS NFR BLD AUTO: 0.3 %
BILIRUB SERPL-MCNC: 0.7 MG/DL (ref 0.1–2)
BILIRUB UR QL STRIP.AUTO: NEGATIVE
BUN BLD-MCNC: 12 MG/DL (ref 7–18)
CALCIUM BLD-MCNC: 8.9 MG/DL (ref 8.5–10.1)
CHLORIDE SERPL-SCNC: 105 MMOL/L (ref 98–112)
CLARITY UR REFRACT.AUTO: CLEAR
CO2 SERPL-SCNC: 22 MMOL/L (ref 21–32)
COLOR UR AUTO: YELLOW
CREAT BLD-MCNC: 0.82 MG/DL
EGFRCR SERPLBLD CKD-EPI 2021: 68 ML/MIN/1.73M2 (ref 60–?)
EOSINOPHIL # BLD AUTO: 0.05 X10(3) UL (ref 0–0.7)
EOSINOPHIL NFR BLD AUTO: 0.4 %
ERYTHROCYTE [DISTWIDTH] IN BLOOD BY AUTOMATED COUNT: 15.3 %
GLOBULIN PLAS-MCNC: 3.9 G/DL (ref 2.8–4.4)
GLUCOSE BLD-MCNC: 131 MG/DL (ref 70–99)
GLUCOSE UR STRIP.AUTO-MCNC: NEGATIVE MG/DL
HCT VFR BLD AUTO: 33.2 %
HGB BLD-MCNC: 11.1 G/DL
IMM GRANULOCYTES # BLD AUTO: 0.07 X10(3) UL (ref 0–1)
IMM GRANULOCYTES NFR BLD: 0.6 %
INR BLD: 2.13 (ref 0.85–1.16)
KETONES UR STRIP.AUTO-MCNC: NEGATIVE MG/DL
LEUKOCYTE ESTERASE UR QL STRIP.AUTO: NEGATIVE
LYMPHOCYTES # BLD AUTO: 0.53 X10(3) UL (ref 1–4)
LYMPHOCYTES NFR BLD AUTO: 4.6 %
MCH RBC QN AUTO: 31.9 PG (ref 26–34)
MCHC RBC AUTO-ENTMCNC: 33.4 G/DL (ref 31–37)
MCV RBC AUTO: 95.4 FL
MONOCYTES # BLD AUTO: 0.8 X10(3) UL (ref 0.1–1)
MONOCYTES NFR BLD AUTO: 6.9 %
NEUTROPHILS # BLD AUTO: 10.16 X10 (3) UL (ref 1.5–7.7)
NEUTROPHILS # BLD AUTO: 10.16 X10(3) UL (ref 1.5–7.7)
NEUTROPHILS NFR BLD AUTO: 87.2 %
NITRITE UR QL STRIP.AUTO: NEGATIVE
NT-PROBNP SERPL-MCNC: 4049 PG/ML (ref ?–450)
OSMOLALITY SERPL CALC.SUM OF ELEC: 280 MOSM/KG (ref 275–295)
PH UR STRIP.AUTO: 6.5 [PH] (ref 5–8)
PLATELET # BLD AUTO: 340 10(3)UL (ref 150–450)
POTASSIUM SERPL-SCNC: 3.6 MMOL/L (ref 3.5–5.1)
PROT SERPL-MCNC: 6.4 G/DL (ref 6.4–8.2)
PROT UR STRIP.AUTO-MCNC: NEGATIVE MG/DL
PROTHROMBIN TIME: 23.6 SECONDS (ref 11.6–14.8)
Q-T INTERVAL: 308 MS
QRS DURATION: 74 MS
QTC CALCULATION (BEZET): 382 MS
R AXIS: -28 DEGREES
RBC # BLD AUTO: 3.48 X10(6)UL
SODIUM SERPL-SCNC: 134 MMOL/L (ref 136–145)
SP GR UR STRIP.AUTO: 1.01 (ref 1–1.03)
T AXIS: 66 DEGREES
T4 FREE SERPL-MCNC: 1.6 NG/DL (ref 0.8–1.7)
TROPONIN I HIGH SENSITIVITY: 16 NG/L
TSI SER-ACNC: 3.24 MIU/ML (ref 0.36–3.74)
UROBILINOGEN UR STRIP.AUTO-MCNC: 1 MG/DL
VENTRICULAR RATE: 93 BPM
WBC # BLD AUTO: 11.6 X10(3) UL (ref 4–11)

## 2023-07-26 PROCEDURE — 99223 1ST HOSP IP/OBS HIGH 75: CPT | Performed by: HOSPITALIST

## 2023-07-26 PROCEDURE — 76882 US LMTD JT/FCL EVL NVASC XTR: CPT | Performed by: EMERGENCY MEDICINE

## 2023-07-26 PROCEDURE — 71045 X-RAY EXAM CHEST 1 VIEW: CPT | Performed by: EMERGENCY MEDICINE

## 2023-07-26 RX ORDER — MELATONIN
3 NIGHTLY PRN
Status: DISCONTINUED | OUTPATIENT
Start: 2023-07-26 | End: 2023-07-29

## 2023-07-26 RX ORDER — ASPIRIN 81 MG/1
81 TABLET ORAL DAILY
Status: DISCONTINUED | OUTPATIENT
Start: 2023-07-27 | End: 2023-07-29

## 2023-07-26 RX ORDER — BISACODYL 10 MG
10 SUPPOSITORY, RECTAL RECTAL
Status: DISCONTINUED | OUTPATIENT
Start: 2023-07-26 | End: 2023-07-29

## 2023-07-26 RX ORDER — ATORVASTATIN CALCIUM 40 MG/1
40 TABLET, FILM COATED ORAL NIGHTLY
Status: DISCONTINUED | OUTPATIENT
Start: 2023-07-26 | End: 2023-07-29

## 2023-07-26 RX ORDER — FUROSEMIDE 10 MG/ML
40 INJECTION INTRAMUSCULAR; INTRAVENOUS ONCE
Status: COMPLETED | OUTPATIENT
Start: 2023-07-26 | End: 2023-07-26

## 2023-07-26 RX ORDER — ECHINACEA PURPUREA EXTRACT 125 MG
1 TABLET ORAL
Status: DISCONTINUED | OUTPATIENT
Start: 2023-07-26 | End: 2023-07-29

## 2023-07-26 RX ORDER — POLYETHYLENE GLYCOL 3350 17 G/17G
17 POWDER, FOR SOLUTION ORAL DAILY PRN
Status: DISCONTINUED | OUTPATIENT
Start: 2023-07-26 | End: 2023-07-29

## 2023-07-26 RX ORDER — LATANOPROST 50 UG/ML
1 SOLUTION/ DROPS OPHTHALMIC NIGHTLY
Status: DISCONTINUED | OUTPATIENT
Start: 2023-07-26 | End: 2023-07-29

## 2023-07-26 RX ORDER — ACETAMINOPHEN 500 MG
500 TABLET ORAL EVERY 4 HOURS PRN
Status: DISCONTINUED | OUTPATIENT
Start: 2023-07-26 | End: 2023-07-29

## 2023-07-26 RX ORDER — LEVOTHYROXINE SODIUM 0.07 MG/1
75 TABLET ORAL DAILY
Status: DISCONTINUED | OUTPATIENT
Start: 2023-07-26 | End: 2023-07-29

## 2023-07-26 RX ORDER — SENNOSIDES 8.6 MG
17.2 TABLET ORAL NIGHTLY PRN
Status: DISCONTINUED | OUTPATIENT
Start: 2023-07-26 | End: 2023-07-29

## 2023-07-26 RX ORDER — ONDANSETRON 2 MG/ML
8 INJECTION INTRAMUSCULAR; INTRAVENOUS EVERY 6 HOURS PRN
Status: DISCONTINUED | OUTPATIENT
Start: 2023-07-26 | End: 2023-07-29

## 2023-07-26 RX ORDER — BENZONATATE 100 MG/1
200 CAPSULE ORAL 3 TIMES DAILY PRN
Status: DISCONTINUED | OUTPATIENT
Start: 2023-07-26 | End: 2023-07-29

## 2023-07-26 RX ORDER — FUROSEMIDE 10 MG/ML
40 INJECTION INTRAMUSCULAR; INTRAVENOUS DAILY
Status: DISCONTINUED | OUTPATIENT
Start: 2023-07-27 | End: 2023-07-28

## 2023-07-26 RX ORDER — ESCITALOPRAM OXALATE 5 MG/1
5 TABLET ORAL DAILY
Status: DISCONTINUED | OUTPATIENT
Start: 2023-07-27 | End: 2023-07-29

## 2023-07-26 NOTE — ED INITIAL ASSESSMENT (HPI)
Pt presents with increased BLE swelling since yesterday.  Pt states it feels like her legs are tight and painful

## 2023-07-27 LAB
ANION GAP SERPL CALC-SCNC: 7 MMOL/L (ref 0–18)
BUN BLD-MCNC: 14 MG/DL (ref 7–18)
C DIFF TOX B STL QL: NEGATIVE
CALCIUM BLD-MCNC: 8.8 MG/DL (ref 8.5–10.1)
CHLORIDE SERPL-SCNC: 101 MMOL/L (ref 98–112)
CO2 SERPL-SCNC: 25 MMOL/L (ref 21–32)
CREAT BLD-MCNC: 0.94 MG/DL
CRP SERPL-MCNC: 17.5 MG/DL (ref ?–0.3)
EGFRCR SERPLBLD CKD-EPI 2021: 58 ML/MIN/1.73M2 (ref 60–?)
ERYTHROCYTE [DISTWIDTH] IN BLOOD BY AUTOMATED COUNT: 15 %
ERYTHROCYTE [SEDIMENTATION RATE] IN BLOOD: 72 MM/HR
GLUCOSE BLD-MCNC: 102 MG/DL (ref 70–99)
HCT VFR BLD AUTO: 35.1 %
HGB BLD-MCNC: 11.5 G/DL
MAGNESIUM SERPL-MCNC: 2 MG/DL (ref 1.6–2.6)
MCH RBC QN AUTO: 31.4 PG (ref 26–34)
MCHC RBC AUTO-ENTMCNC: 32.8 G/DL (ref 31–37)
MCV RBC AUTO: 95.9 FL
OSMOLALITY SERPL CALC.SUM OF ELEC: 277 MOSM/KG (ref 275–295)
PLATELET # BLD AUTO: 333 10(3)UL (ref 150–450)
POTASSIUM SERPL-SCNC: 3.4 MMOL/L (ref 3.5–5.1)
POTASSIUM SERPL-SCNC: 4.3 MMOL/L (ref 3.5–5.1)
RBC # BLD AUTO: 3.66 X10(6)UL
SODIUM SERPL-SCNC: 133 MMOL/L (ref 136–145)
WBC # BLD AUTO: 10.4 X10(3) UL (ref 4–11)

## 2023-07-27 PROCEDURE — 99233 SBSQ HOSP IP/OBS HIGH 50: CPT | Performed by: HOSPITALIST

## 2023-07-27 PROCEDURE — 99223 1ST HOSP IP/OBS HIGH 75: CPT | Performed by: STUDENT IN AN ORGANIZED HEALTH CARE EDUCATION/TRAINING PROGRAM

## 2023-07-27 RX ORDER — POTASSIUM CHLORIDE 20 MEQ/1
40 TABLET, EXTENDED RELEASE ORAL EVERY 4 HOURS
Status: COMPLETED | OUTPATIENT
Start: 2023-07-27 | End: 2023-07-27

## 2023-07-27 RX ORDER — PREDNISONE 10 MG/1
10 TABLET ORAL
Status: DISCONTINUED | OUTPATIENT
Start: 2023-07-27 | End: 2023-07-29

## 2023-07-27 NOTE — PROGRESS NOTES
Pt is alert x 4, on Ra, Afib on the monitor, with rate going to the 110's. PT denies any cardiovascular symptoms at this time. BLE +2 edema , pain full to touch,  Pt is up with a cane at home, independent at home . All needs met and will continue to monitor. Admission skin assessment done. Skin intact;   PLAN: IV lasix, Dw, monitor heart rate, pain management. POC: Discussed and updated with pt, pt verbalized understanding.

## 2023-07-27 NOTE — CDS QUERY
Gustavus Spurling    Dear Doctor:  Clinical information in the patient's medical record (provided below) includes documentation of Atrial Fibrillation. For accurate ICD-10-CM code assignment   PLEASE INDICATE IF THE ATRIAL FIBRILLATION CAN BE FURTHER SPECIFIED:     [  ] Chronic Atrial Fibrillation  [  x] Other (please specify): persistent            Documentation from the Medical Record:     ED Impression: acute on chronic chf notes: 1-2+ pedal edema noted bilaterally     H&P: acute on chronic diastolic chf probably 2/2 steroids, chf and venous insufficiency ; left groin lump u/s pending; cad; afib - resume doac once meds reviewed; hypothyroidism; pmr  EKG: Atrial fibrillation with a competing junctional pacemaker     For questions regarding this query, please contact Clinical Documentation :  Divya Call FABBY CDS Cell# [phone]                          Thank you!                                                        THIS FORM IS A PERMANENT PART OF THE MEDICAL RECORD

## 2023-07-27 NOTE — PLAN OF CARE
Alert and o x 4 , on RA , breathing pattern easy and non labored. Tele shows Afib on the monitor; holding Eliquis per MD. Pt. Denies any pain or discomfort, Left groin , soft , with mild tenderness noted upon palpation. Cont. Monitor per tele/labs/v/s. Meds as ordered. Fall/safety precautions instructed , placed call light w/in reach. Problem: CARDIOVASCULAR - ADULT  Goal: Maintains optimal cardiac output and hemodynamic stability  Description: INTERVENTIONS:  - Monitor vital signs, rhythm, and trends  - Monitor for bleeding, hypotension and signs of decreased cardiac output  - Evaluate effectiveness of vasoactive medications to optimize hemodynamic stability  - Monitor arterial and/or venous puncture sites for bleeding and/or hematoma  - Assess quality of pulses, skin color and temperature  - Assess for signs of decreased coronary artery perfusion - ex.  Angina  - Evaluate fluid balance, assess for edema, trend weights  Outcome: Progressing  Goal: Absence of cardiac arrhythmias or at baseline  Description: INTERVENTIONS:  - Continuous cardiac monitoring, monitor vital signs, obtain 12 lead EKG if indicated  - Evaluate effectiveness of antiarrhythmic and heart rate control medications as ordered  - Initiate emergency measures for life threatening arrhythmias  - Monitor electrolytes and administer replacement therapy as ordered  Outcome: Progressing

## 2023-07-27 NOTE — PLAN OF CARE
Assumed patient care around 0730 this AM. Patient A&O x4. SPO2 maintained on RA. Afib on tele, HR controlled. Eliquis on hold. Surgery consult to eval small left groin lump pending- US performed yesterday. Pt reports pain at site upon palpation. BLE edema, diuresing with IV lasix, compression stockings applied; pt reports chronic leg tenderness right worse than left. K replaced per protocol. Continent of B&B, BM today. Up SBA with walker. Updated on POC, needs met. Discussed code status with pt as none listed on file- per pt she does not want CPR, is not sure about intubation/ etc. Pt wishes to be listed as \"DNAR full tx\" and will discuss wishes/POLST with [facility] 1315: Checked in with Dr. Brice Freeman regarding POC. Per pt/granddaughter pt had been weaned off of Prednisone but 10mg daily ordered- per Dr. Brice Freeman she confirmed with rheum Dr. Stefania Dumont that pt should be taking Prednisone 10mg daily. Also clarified group coverage which was brought up by [facility] surgery team as pt's PCP is [facility]-- pt changing to [facility] PCP in October d/t insurance, ok to keep EDW Mds this admit. Problem: CARDIOVASCULAR - ADULT  Goal: Maintains optimal cardiac output and hemodynamic stability  Description: INTERVENTIONS:  - Monitor vital signs, rhythm, and trends  - Monitor for bleeding, hypotension and signs of decreased cardiac output  - Evaluate effectiveness of vasoactive medications to optimize hemodynamic stability  - Monitor arterial and/or venous puncture sites for bleeding and/or hematoma  - Assess quality of pulses, skin color and temperature  - Assess for signs of decreased coronary artery perfusion - ex.  Angina  - Evaluate fluid balance, assess for edema, trend weights  Outcome: Progressing  Goal: Absence of cardiac arrhythmias or at baseline  Description: INTERVENTIONS:  - Continuous cardiac monitoring, monitor vital signs, obtain 12 lead EKG if indicated  - Evaluate effectiveness of antiarrhythmic and heart rate control medications as ordered  - Initiate emergency measures for life threatening arrhythmias  - Monitor electrolytes and administer replacement therapy as ordered  Outcome: Progressing     Problem: PAIN - ADULT  Goal: Verbalizes/displays adequate comfort level or patient's stated pain goal  Description: INTERVENTIONS:  - Encourage pt to monitor pain and request assistance  - Assess pain using appropriate pain scale  - Administer analgesics based on type and severity of pain and evaluate response  - Implement non-pharmacological measures as appropriate and evaluate response  - Consider cultural and social influences on pain and pain management  - Manage/alleviate anxiety  - Utilize distraction and/or relaxation techniques  - Monitor for opioid side effects  - Notify MD/LIP if interventions unsuccessful or patient reports new pain  - Anticipate increased pain with activity and pre-medicate as appropriate  Outcome: Progressing     Problem: SAFETY ADULT - FALL  Goal: Free from fall injury  Description: INTERVENTIONS:  - Assess pt frequently for physical needs  - Identify cognitive and physical deficits and behaviors that affect risk of falls.   - [place] fall precautions as indicated by assessment.  - Educate pt/family on patient safety including physical limitations  - Instruct pt to call for assistance with activity based on assessment  - Modify environment to reduce risk of injury  - Provide assistive devices as appropriate  - Consider OT/PT consult to assist with strengthening/mobility  - Encourage toileting schedule  Outcome: Progressing

## 2023-07-28 ENCOUNTER — APPOINTMENT (OUTPATIENT)
Dept: ULTRASOUND IMAGING | Facility: HOSPITAL | Age: 88
End: 2023-07-28
Payer: MEDICARE

## 2023-07-28 ENCOUNTER — APPOINTMENT (OUTPATIENT)
Dept: CT IMAGING | Facility: HOSPITAL | Age: 88
End: 2023-07-28
Attending: STUDENT IN AN ORGANIZED HEALTH CARE EDUCATION/TRAINING PROGRAM
Payer: MEDICARE

## 2023-07-28 VITALS
SYSTOLIC BLOOD PRESSURE: 95 MMHG | HEART RATE: 72 BPM | RESPIRATION RATE: 16 BRPM | OXYGEN SATURATION: 94 % | WEIGHT: 160.06 LBS | DIASTOLIC BLOOD PRESSURE: 53 MMHG | BODY MASS INDEX: 29 KG/M2 | TEMPERATURE: 98 F

## 2023-07-28 PROCEDURE — 93926 LOWER EXTREMITY STUDY: CPT

## 2023-07-28 PROCEDURE — 75635 CT ANGIO ABDOMINAL ARTERIES: CPT | Performed by: STUDENT IN AN ORGANIZED HEALTH CARE EDUCATION/TRAINING PROGRAM

## 2023-07-28 PROCEDURE — 99239 HOSP IP/OBS DSCHRG MGMT >30: CPT | Performed by: HOSPITALIST

## 2023-07-28 RX ORDER — AMOXICILLIN AND CLAVULANATE POTASSIUM 875; 125 MG/1; MG/1
1 TABLET, FILM COATED ORAL 2 TIMES DAILY
Qty: 14 TABLET | Refills: 0 | Status: SHIPPED | OUTPATIENT
Start: 2023-07-28 | End: 2023-08-04

## 2023-07-28 RX ORDER — FUROSEMIDE 20 MG/1
20 TABLET ORAL DAILY
Qty: 30 TABLET | Refills: 3 | Status: SHIPPED | OUTPATIENT
Start: 2023-07-28 | End: 2023-08-27

## 2023-07-28 RX ORDER — FUROSEMIDE 20 MG/1
20 TABLET ORAL DAILY
Status: DISCONTINUED | OUTPATIENT
Start: 2023-07-28 | End: 2023-07-29

## 2023-07-28 NOTE — PLAN OF CARE
Patient alert and oriented x 4. On room air. AFIB on cardiac monitor. HR controlled Patient denies any chest pain or chest discomfort at this time. Patient voids, last BM 7/27. Npo now for 2 hrs for CT abdomen and pelvis in am ,RT ac #20 iv line on , no acute distress noted, . Plan of care updated, all questions answered. Safety precautions in place. Bed alarm on. Will continue to monitor tele/labs/vital signs closely. Plan:  CT abdomen and pelvis, US  IN AM   Problem: Patient/Family Goals  Goal: Patient/Family Long Term Goal  Description: Patient's Long Term Goal: \"stay out of hospital\"    Interventions:  - follow ups, medication, diet and activity as ordered  - See additional Care Plan goals for specific interventions  Outcome: Progressing  Goal: Patient/Family Short Term Goal  Description: Patient's Short Term Goal: \"no more pain\"    Interventions:   - pain assessment and PRN interventions  - surgery eval for groin  - cardiology eval for fluid overload; diuretics, compression socks  - See additional Care Plan goals for specific interventions  Outcome: Progressing     Problem: CARDIOVASCULAR - ADULT  Goal: Maintains optimal cardiac output and hemodynamic stability  Description: INTERVENTIONS:  - Monitor vital signs, rhythm, and trends  - Monitor for bleeding, hypotension and signs of decreased cardiac output  - Evaluate effectiveness of vasoactive medications to optimize hemodynamic stability  - Monitor arterial and/or venous puncture sites for bleeding and/or hematoma  - Assess quality of pulses, skin color and temperature  - Assess for signs of decreased coronary artery perfusion - ex.  Angina  - Evaluate fluid balance, assess for edema, trend weights  Outcome: Progressing  Goal: Absence of cardiac arrhythmias or at baseline  Description: INTERVENTIONS:  - Continuous cardiac monitoring, monitor vital signs, obtain 12 lead EKG if indicated  - Evaluate effectiveness of antiarrhythmic and heart rate control medications as ordered  - Initiate emergency measures for life threatening arrhythmias  - Monitor electrolytes and administer replacement therapy as ordered  Outcome: Progressing     Problem: PAIN - ADULT  Goal: Verbalizes/displays adequate comfort level or patient's stated pain goal  Description: INTERVENTIONS:  - Encourage pt to monitor pain and request assistance  - Assess pain using appropriate pain scale  - Administer analgesics based on type and severity of pain and evaluate response  - Implement non-pharmacological measures as appropriate and evaluate response  - Consider cultural and social influences on pain and pain management  - Manage/alleviate anxiety  - Utilize distraction and/or relaxation techniques  - Monitor for opioid side effects  - Notify MD/LIP if interventions unsuccessful or patient reports new pain  - Anticipate increased pain with activity and pre-medicate as appropriate  Outcome: Progressing     Problem: SAFETY ADULT - FALL  Goal: Free from fall injury  Description: INTERVENTIONS:  - Assess pt frequently for physical needs  - Identify cognitive and physical deficits and behaviors that affect risk of falls.   - [place] fall precautions as indicated by assessment.  - Educate pt/family on patient safety including physical limitations  - Instruct pt to call for assistance with activity based on assessment  - Modify environment to reduce risk of injury  - Provide assistive devices as appropriate  - Consider OT/PT consult to assist with strengthening/mobility  - Encourage toileting schedule  Outcome: Progressing     Problem: DISCHARGE PLANNING  Goal: Discharge to home or other facility with appropriate resources  Description: INTERVENTIONS:  - Identify barriers to discharge w/pt and caregiver  - Include patient/family/discharge partner in discharge planning  - Arrange for needed discharge resources and transportation as appropriate  - Identify discharge learning needs (meds, wound care, etc)  - Arrange for interpreters to assist at discharge as needed  - Consider post-discharge preferences of patient/family/discharge partner  - Complete POLST form as appropriate  - Assess patient's ability to be responsible for managing their own health  - Refer to Case Management Department for coordinating discharge planning if the patient needs post-hospital services based on physician/LIP order or complex needs related to functional status, cognitive ability or social support system  Outcome: Progressing     Problem: SAFETY ADULT - FALL  Goal: Free from fall injury  Description: INTERVENTIONS:  - Assess pt frequently for physical needs  - Identify cognitive and physical deficits and behaviors that affect risk of falls.   - [place] fall precautions as indicated by assessment.  - Educate pt/family on patient safety including physical limitations  - Instruct pt to call for assistance with activity based on assessment  - Modify environment to reduce risk of injury  - Provide assistive devices as appropriate  - Consider OT/PT consult to assist with strengthening/mobility  - Encourage toileting schedule  Outcome: Progressing

## 2023-07-28 NOTE — PLAN OF CARE
Assumed patient care around 0730 this aM. Patient A&O x4. SPO2 maintained on RA, no c/o SOB or FELIPE. Diuretics changed to PO today per cardiology. BLE edema improving, compression stockings in place. Afib on tele, HR controlled. Eliquis remains on hold. Orders for CT and US to investigate left groin today. Continent of B&B, multiple Bms yesterday 7/27, cdiff negative. Denies pain. Up SBA with walker. Updated on POC, needs met. 1900: Explained discharge instructions including medications and follow ups to the patient, questions answered, verbalize understanding. Also called and reviewed dc AVS with daughter Fede Adams per pt request. Copy of printed antibiotic script faxed to pharmacy. PIV removed, tele monitor discontinued. Daughter will be here to  in about 1hr. Problem: CARDIOVASCULAR - ADULT  Goal: Maintains optimal cardiac output and hemodynamic stability  Description: INTERVENTIONS:  - Monitor vital signs, rhythm, and trends  - Monitor for bleeding, hypotension and signs of decreased cardiac output  - Evaluate effectiveness of vasoactive medications to optimize hemodynamic stability  - Monitor arterial and/or venous puncture sites for bleeding and/or hematoma  - Assess quality of pulses, skin color and temperature  - Assess for signs of decreased coronary artery perfusion - ex.  Angina  - Evaluate fluid balance, assess for edema, trend weights  Outcome: Progressing  Goal: Absence of cardiac arrhythmias or at baseline  Description: INTERVENTIONS:  - Continuous cardiac monitoring, monitor vital signs, obtain 12 lead EKG if indicated  - Evaluate effectiveness of antiarrhythmic and heart rate control medications as ordered  - Initiate emergency measures for life threatening arrhythmias  - Monitor electrolytes and administer replacement therapy as ordered  Outcome: Progressing     Problem: PAIN - ADULT  Goal: Verbalizes/displays adequate comfort level or patient's stated pain goal  Description: INTERVENTIONS:  - Encourage pt to monitor pain and request assistance  - Assess pain using appropriate pain scale  - Administer analgesics based on type and severity of pain and evaluate response  - Implement non-pharmacological measures as appropriate and evaluate response  - Consider cultural and social influences on pain and pain management  - Manage/alleviate anxiety  - Utilize distraction and/or relaxation techniques  - Monitor for opioid side effects  - Notify MD/LIP if interventions unsuccessful or patient reports new pain  - Anticipate increased pain with activity and pre-medicate as appropriate  Outcome: Progressing     Problem: SAFETY ADULT - FALL  Goal: Free from fall injury  Description: INTERVENTIONS:  - Assess pt frequently for physical needs  - Identify cognitive and physical deficits and behaviors that affect risk of falls.   - [place] fall precautions as indicated by assessment.  - Educate pt/family on patient safety including physical limitations  - Instruct pt to call for assistance with activity based on assessment  - Modify environment to reduce risk of injury  - Provide assistive devices as appropriate  - Consider OT/PT consult to assist with strengthening/mobility  - Encourage toileting schedule  Outcome: Progressing

## 2023-07-31 ENCOUNTER — TELEPHONE (OUTPATIENT)
Facility: LOCATION | Age: 88
End: 2023-07-31

## 2023-07-31 NOTE — PAYOR COMM NOTE
--------------  DISCHARGE REVIEW    Payor: [employer]  Subscriber #:  [id number]  Authorization Number: [id number]    Admit date: 23  Admit time:   6:15 PM  Discharge Date: 2023  8:30 PM     Admitting Physician: Aruna Cueva MD  Attending Physician:  No att. providers found  Primary Care Physician: Deepak Hale MD          Discharge Summary Notes        Discharge Summary signed by Lidia Pérez MD at 2023  8:39 AM       Author: Lidia Pérez MD Specialty: HOSPITALIST, Internal Medicine Author Type: Physician    Filed: 2023  8:39 AM Date of Service: 2023  8:36 AM Status: Signed    : Lidia Pérez MD (Physician)           Discharge Summary     Gilma Banks Patient Status:  Inpatient    1/3/1933 MRN [id number]   Location [address] [place] [address] 2NE-A Attending No att. providers found   Hosp Day # 2 PCP Luis Manuel Alvarado MD     Date of Admission: 2023  Date of Discharge: 2023  Discharge Disposition: Home or Self Care    Discharge Diagnosis:   Shortness of breath lower extremities edema improved with IV Lasix  Acute diastolic CHF moderate aortic insufficiency  History of CAD on aspirin beta-blocker and statin  Polymyalgia rheumatica with elevated CRP patient was recently weaned off of prednisone  Discussed with rheumatology low-dose prednisone resumed  New onset A-fib on Eliquis and Lopressor  Left inguinal cyst possible old diverticular and short course of antibiotics    History of Present Illness: 55-year-old female presents with shortness of breath lower extremities edema intermittent inguinal discomfort. Brief Synopsis: Patient improved with IV Lasix. She was seen by general surgery as well as cardiology. She was discharged on short course of antibiotics and advised to follow-up with general surgery. She will start seeing her new primary care physician Dr. Dora Mckinley in October.     Lace+ Score: 73  59-90 High Risk  29-58 Medium Risk  0-28   Low Risk       TCM Follow-Up Recommendation:  LACE > 58: High Risk of readmission after discharge from the hospital.    Procedures during hospitalization:       Incidental or significant findings and recommendations (brief descriptions):      Lab/Test results pending at Discharge:       Consultants:  Gen sx  cardiology    Discharge Medication List:     Discharge Medications        START taking these medications        Instructions Prescription details   amoxicillin clavulanate 875-125 MG Tabs  Commonly known as: Augmentin      Take 1 tablet by mouth 2 (two) times daily for 7 days. Stop taking on: August 4, 2023  Quantity: 14 tablet  Refills: 0            CHANGE how you take these medications        Instructions Prescription details   predniSONE 10 MG Tabs  Commonly known as: Deltasone  What changed: Another medication with the same name was removed. Continue taking this medication, and follow the directions you see here. Take in combination with the 20mg according to taper. 80mg daily x 3 days, then 70mg daily x 3 days, then 60mg daily x 3 days, then 50mg daily x 3 days, then 40mg daily. Quantity: 90 tablet  Refills: 0            CONTINUE taking these medications        Instructions Prescription details   apixaban 5 MG Tabs  Commonly known as: Eliquis      Take 1 tablet (5 mg total) by mouth 2 (two) times daily. Quantity: 60 tablet  Refills: 3     aspirin 81 MG Tbec      Take 1 tablet (81 mg total) by mouth daily. Refills: 0     atorvastatin 40 MG Tabs  Commonly known as: Lipitor      Take 1 tablet (40 mg total) by mouth at bedtime. Quantity: 90 tablet  Refills: 1     B-Complex Tabs      Take 1 tablet by mouth daily with lunch. Refills: 0     escitalopram 5 MG Tabs  Commonly known as: Lexapro      Take 1 tablet (5 mg total) by mouth daily. Quantity: 90 tablet  Refills: 1     furosemide 20 MG Tabs  Commonly known as: Lasix      Take 1 tablet (20 mg total) by mouth daily.    Stop taking on: August 27, 2023  Quantity: 30 tablet  Refills: 3     gabapentin 100 MG Caps  Commonly known as: Neurontin      Take 1 capsule (100 mg total) by mouth as needed. Refills: 0     latanoprost 0.005 % Soln  Commonly known as: Xalatan      Place 1 drop into both eyes nightly. Refills: 3     levothyroxine 75 MCG Tabs  Commonly known as: Synthroid      Take 1 tablet (75 mcg total) by mouth daily. Quantity: 90 tablet  Refills: 1     metoprolol tartrate 25 MG Tabs  Commonly known as: Lopressor      Take 1 tablet (25 mg total) by mouth 2 (two) times daily. Quantity: 180 tablet  Refills: 1     valACYclovir 500 MG Tabs  Commonly known as: Valtrex      Take 1 tablet (500 mg total) by mouth 2 (two) times daily. For shingles. Quantity: 14 tablet  Refills: 0            STOP taking these medications      meclizine 25 MG Tabs  Commonly known as: Antivert        sulfamethoxazole-trimethoprim -160 MG Tabs per tablet  Commonly known as: Bactrim DS                  Where to Get Your Medications        These medications were sent to [facility], [address] [phone], [place], [address]      Phone: [phone]   furosemide 20 MG Tabs       Please  your prescriptions at the location directed by your doctor or nurse    Bring a paper prescription for each of these medications  amoxicillin clavulanate 875-125 MG Tabs         Follow-up appointment:   Cony Michelle, [address] [facility] [address]  [phone]    Follow up  As needed.  To monitor left groin hernia    Appointments for Next 30 Days 7/31/2023 - 8/30/2023        Date Arrival Time Visit Type Length Department Provider     8/3/2023  1:30 PM  EXAM - ESTABLISHED [2844] 30 min [address] [place] [address] Byron DO Austin    Patient Instructions:                           Supplementary Documentation:   DLNDG reviewed: 35 minutes    Vital signs:       Physical Exam: General:  NAD  Cardiovascular:  S1, S2    -----------------------------------------------------------------------------------------------  PATIENT DISCHARGE INSTRUCTIONS: See electronic chart    Tip: Documentation requirements: For split shared discharge, BOTH providers need to document specific floor, unit, and time spent on the discharge. The note needs to be signed by the provider with > 50% of time and bill under their NPI.    Time spent:  35 minutes        Cas Mcduffie MD        Electronically signed by Aliza Wolf MD on 7/31/2023  8:39 AM         REVIEWER COMMENTS

## 2023-07-31 NOTE — TELEPHONE ENCOUNTER
Amy Pierce was seen in the ER by Dr. Kofi Sauceda on 7/27/23 for left groin mass. A CT was performed which revealed left inguinal hernia without incarceration and strangulation. The patient did not wish to proceed with surgery at that time. Dr. Kofi Saucdea recommended outpatient follow up in 2 weeks. The patient's daughter called stating her mother has been having increased pain with physical activity. She states she has been taking Tylenol as needed with minimal pain relief. I explained to the daughter I could send a prescription for Oxycodone, but the mother states she would like continue scheduled Tylenol. I helped schedule an appointment with Dr. Kofi Sauceda for 1:15 PM tomorrow (8/1/23). All of the patient's questions and concerns were addressed.      Cherylene Mace, PA-C

## 2023-07-31 NOTE — DISCHARGE SUMMARY
----- Message from Kirby Hernandez MD sent at 1/14/2022 11:20 AM CST -----  Please advise this chest x-ray was normal.  Please follow through with the cardiac stress test that has been scheduled.   Discharge Summary     Tao Peres Patient Status:  Inpatient    1/3/1933 MRN [id number]   [facility] 2NE-A Attending No att. providers found   Hosp Day # 2 PCP Natacha Walker MD     Date of Admission: 2023  Date of Discharge: 2023  Discharge Disposition: Home or Self Care    Discharge Diagnosis:   Shortness of breath lower extremities edema improved with IV Lasix  Acute diastolic CHF moderate aortic insufficiency  History of CAD on aspirin beta-blocker and statin  Polymyalgia rheumatica with elevated CRP patient was recently weaned off of prednisone  Discussed with rheumatology low-dose prednisone resumed  New onset A-fib on Eliquis and Lopressor  Left inguinal cyst possible old diverticular and short course of antibiotics    History of Present Illness: 68-year-old female presents with shortness of breath lower extremities edema intermittent inguinal discomfort. Brief Synopsis: Patient improved with IV Lasix. She was seen by general surgery as well as cardiology. She was discharged on short course of antibiotics and advised to follow-up with general surgery. She will start seeing her new primary care physician Dr. Can Gasca in October. Lace+ Score: 73  59-90 High Risk  29-58 Medium Risk  0-28   Low Risk       TCM Follow-Up Recommendation:  LACE > 58: High Risk of readmission after discharge from the hospital.    Procedures during hospitalization:       Incidental or significant findings and recommendations (brief descriptions):      Lab/Test results pending at Discharge:       Consultants:  Gen sx  cardiology    Discharge Medication List:     Discharge Medications        START taking these medications        Instructions Prescription details   amoxicillin clavulanate 875-125 MG Tabs  Commonly known as: Augmentin      Take 1 tablet by mouth 2 (two) times daily for 7 days.    Stop taking on: 2023  Quantity: 14 tablet  Refills: 0            CHANGE how you take these medications        Instructions Prescription details   predniSONE 10 MG Tabs  Commonly known as: Deltasone  What changed: Another medication with the same name was removed. Continue taking this medication, and follow the directions you see here. Take in combination with the 20mg according to taper. 80mg daily x 3 days, then 70mg daily x 3 days, then 60mg daily x 3 days, then 50mg daily x 3 days, then 40mg daily. Quantity: 90 tablet  Refills: 0            CONTINUE taking these medications        Instructions Prescription details   apixaban 5 MG Tabs  Commonly known as: Eliquis      Take 1 tablet (5 mg total) by mouth 2 (two) times daily. Quantity: 60 tablet  Refills: 3     aspirin 81 MG Tbec      Take 1 tablet (81 mg total) by mouth daily. Refills: 0     atorvastatin 40 MG Tabs  Commonly known as: Lipitor      Take 1 tablet (40 mg total) by mouth at bedtime. Quantity: 90 tablet  Refills: 1     B-Complex Tabs      Take 1 tablet by mouth daily with lunch. Refills: 0     escitalopram 5 MG Tabs  Commonly known as: Lexapro      Take 1 tablet (5 mg total) by mouth daily. Quantity: 90 tablet  Refills: 1     furosemide 20 MG Tabs  Commonly known as: Lasix      Take 1 tablet (20 mg total) by mouth daily. Stop taking on: August 27, 2023  Quantity: 30 tablet  Refills: 3     gabapentin 100 MG Caps  Commonly known as: Neurontin      Take 1 capsule (100 mg total) by mouth as needed. Refills: 0     latanoprost 0.005 % Soln  Commonly known as: Xalatan      Place 1 drop into both eyes nightly. Refills: 3     levothyroxine 75 MCG Tabs  Commonly known as: Synthroid      Take 1 tablet (75 mcg total) by mouth daily. Quantity: 90 tablet  Refills: 1     metoprolol tartrate 25 MG Tabs  Commonly known as: Lopressor      Take 1 tablet (25 mg total) by mouth 2 (two) times daily.    Quantity: 180 tablet  Refills: 1     valACYclovir 500 MG Tabs  Commonly known as: Valtrex      Take 1 tablet (500 mg total) by mouth 2 (two) times daily. For shingles. Quantity: 14 tablet  Refills: 0            STOP taking these medications      meclizine 25 MG Tabs  Commonly known as: Antivert        sulfamethoxazole-trimethoprim -160 MG Tabs per tablet  Commonly known as: Bactrim DS                  Where to Get Your Medications        These medications were sent to [facility], [address] [phone], [address], [address]      Phone: [phone]   furosemide 20 MG Tabs       Please  your prescriptions at the location directed by your doctor or nurse    Bring a paper prescription for each of these medications  amoxicillin clavulanate 875-125 MG Tabs         Follow-up appointment:   Lesa Avila, [facility] [address] [place] [id number] [phone]    Follow up  As needed. To monitor left groin hernia    Appointments for Next 30 Days 7/31/2023 - 8/30/2023        Date Arrival Time Visit Type Length Department Provider     8/3/2023  1:30 PM  EXAM - ESTABLISHED [9438] 30 min [facility] Dary Muhammad,     Patient Instructions:                           Supplementary Documentation:   Jazz Wei reviewed: 35 minutes    Vital signs:       Physical Exam:    General:  NAD  Cardiovascular:  S1, S2    -----------------------------------------------------------------------------------------------  PATIENT DISCHARGE INSTRUCTIONS: See electronic chart    Tip: Documentation requirements: For split shared discharge, BOTH providers need to document specific floor, unit, and time spent on the discharge. The note needs to be signed by the provider with > 50% of time and bill under their NPI.    Time spent:  35 minutes         Mary Morgan MD

## 2023-08-01 ENCOUNTER — HOSPITAL ENCOUNTER (EMERGENCY)
Facility: HOSPITAL | Age: 88
Discharge: HOME | End: 2023-08-01
Attending: EMERGENCY MEDICINE
Payer: MEDICARE

## 2023-08-01 VITALS
HEIGHT: 62 IN | OXYGEN SATURATION: 97 % | DIASTOLIC BLOOD PRESSURE: 99 MMHG | RESPIRATION RATE: 18 BRPM | TEMPERATURE: 97 F | SYSTOLIC BLOOD PRESSURE: 126 MMHG | WEIGHT: 160 LBS | HEART RATE: 81 BPM | BODY MASS INDEX: 29.44 KG/M2

## 2023-08-01 DIAGNOSIS — G89.29 CHRONIC ABDOMINAL PAIN: ICD-10-CM

## 2023-08-01 DIAGNOSIS — R10.9 CHRONIC ABDOMINAL PAIN: ICD-10-CM

## 2023-08-01 DIAGNOSIS — R04.0 EPISTAXIS: Primary | ICD-10-CM

## 2023-08-01 LAB
ALBUMIN SERPL-MCNC: 2.6 G/DL (ref 3.4–5)
ALBUMIN/GLOB SERPL: 0.6 {RATIO} (ref 1–2)
ALP LIVER SERPL-CCNC: 79 U/L
ALT SERPL-CCNC: 26 U/L
ANION GAP SERPL CALC-SCNC: 4 MMOL/L (ref 0–18)
AST SERPL-CCNC: 29 U/L (ref 15–37)
BASOPHILS # BLD AUTO: 0.03 X10(3) UL (ref 0–0.2)
BASOPHILS NFR BLD AUTO: 0.3 %
BILIRUB SERPL-MCNC: 0.6 MG/DL (ref 0.1–2)
BUN BLD-MCNC: 14 MG/DL (ref 7–18)
CALCIUM BLD-MCNC: 9 MG/DL (ref 8.5–10.1)
CHLORIDE SERPL-SCNC: 104 MMOL/L (ref 98–112)
CO2 SERPL-SCNC: 26 MMOL/L (ref 21–32)
CREAT BLD-MCNC: 0.96 MG/DL
EGFRCR SERPLBLD CKD-EPI 2021: 56 ML/MIN/1.73M2 (ref 60–?)
EOSINOPHIL # BLD AUTO: 0.02 X10(3) UL (ref 0–0.7)
EOSINOPHIL NFR BLD AUTO: 0.2 %
ERYTHROCYTE [DISTWIDTH] IN BLOOD BY AUTOMATED COUNT: 15 %
GLOBULIN PLAS-MCNC: 4.2 G/DL (ref 2.8–4.4)
GLUCOSE BLD-MCNC: 132 MG/DL (ref 70–99)
HCT VFR BLD AUTO: 34.2 %
HGB BLD-MCNC: 11.4 G/DL
IMM GRANULOCYTES # BLD AUTO: 0.13 X10(3) UL (ref 0–1)
IMM GRANULOCYTES NFR BLD: 1.2 %
LYMPHOCYTES # BLD AUTO: 0.33 X10(3) UL (ref 1–4)
LYMPHOCYTES NFR BLD AUTO: 3 %
MCH RBC QN AUTO: 31.8 PG (ref 26–34)
MCHC RBC AUTO-ENTMCNC: 33.3 G/DL (ref 31–37)
MCV RBC AUTO: 95.5 FL
MONOCYTES # BLD AUTO: 0.28 X10(3) UL (ref 0.1–1)
MONOCYTES NFR BLD AUTO: 2.5 %
NEUTROPHILS # BLD AUTO: 10.31 X10 (3) UL (ref 1.5–7.7)
NEUTROPHILS # BLD AUTO: 10.31 X10(3) UL (ref 1.5–7.7)
NEUTROPHILS NFR BLD AUTO: 92.8 %
OSMOLALITY SERPL CALC.SUM OF ELEC: 280 MOSM/KG (ref 275–295)
PLATELET # BLD AUTO: 443 10(3)UL (ref 150–450)
POTASSIUM SERPL-SCNC: 4.1 MMOL/L (ref 3.5–5.1)
PROT SERPL-MCNC: 6.8 G/DL (ref 6.4–8.2)
RBC # BLD AUTO: 3.58 X10(6)UL
SODIUM SERPL-SCNC: 134 MMOL/L (ref 136–145)
WBC # BLD AUTO: 11.1 X10(3) UL (ref 4–11)

## 2023-08-01 PROCEDURE — 36415 COLL VENOUS BLD VENIPUNCTURE: CPT

## 2023-08-01 PROCEDURE — 85025 COMPLETE CBC W/AUTO DIFF WBC: CPT

## 2023-08-01 PROCEDURE — 99283 EMERGENCY DEPT VISIT LOW MDM: CPT

## 2023-08-01 PROCEDURE — 80053 COMPREHEN METABOLIC PANEL: CPT | Performed by: EMERGENCY MEDICINE

## 2023-08-01 PROCEDURE — 99284 EMERGENCY DEPT VISIT MOD MDM: CPT

## 2023-08-01 PROCEDURE — 85025 COMPLETE CBC W/AUTO DIFF WBC: CPT | Performed by: EMERGENCY MEDICINE

## 2023-08-01 PROCEDURE — 30901 CONTROL OF NOSEBLEED: CPT

## 2023-08-01 PROCEDURE — 80053 COMPREHEN METABOLIC PANEL: CPT

## 2023-08-01 RX ORDER — OXYMETAZOLINE HYDROCHLORIDE 0.05 G/100ML
1 SPRAY NASAL ONCE
Status: COMPLETED | OUTPATIENT
Start: 2023-08-01 | End: 2023-08-01

## 2023-08-01 RX ORDER — TRAMADOL HYDROCHLORIDE 50 MG/1
TABLET ORAL EVERY 8 HOURS PRN
Qty: 10 TABLET | Refills: 0 | Status: SHIPPED | OUTPATIENT
Start: 2023-08-01 | End: 2023-08-06

## 2023-08-01 NOTE — DISCHARGE INSTRUCTIONS
Can continue to take your Augmentin. Packing removal in 3 to 5 days. If you have rebleeding you will be given a clip to wear for 15 minutes without removing. This should stop the bleeding. If it does not return to the emergency department.   Keep your head elevated particularly at night to reduce recurrence of bleeding

## 2023-08-01 NOTE — ED INITIAL ASSESSMENT (HPI)
Pt. States her nosebleed started yesterday but then stopped after a while, was at doctor's office today and nosebleed started again and would not stop, so was sent here. Pt. Is on eliquis. Pt. Also c/o pain from hernia.

## 2023-08-03 NOTE — CDS QUERY
Orin Argueta    Dear Dr. Kim Ruiz :  Clinical information (provided below) documents conflicting diagnoses. For accurate ICD-10-CM code assignment   BASED ON YOUR CLINICAL JUDGEMENT, PLEASE SELECT   THE MOST APPROPRIATE DIAGNOSIS:    (   ) Acute Diastolic Heart Failure   (   ) Acute on Chronic Diastolic Heart Failure           Documentation from the Medical Record:   ED Impression: acute on chronic chf notes: 1-2+ pedal edema noted bilaterally     H&P: acute on chronic diastolic chf probably 2/2 steroids, chf and venous insufficiency; left groin lump u/s pending; cad; afib -; hypothyroidism; pmr    7/27 pn: acute on chronic diastolic chf - iv Lasix; afib; cad;   7/27 Cardio: HFpEF - EF 65% by echo 4/12/23 - moderate aortic insufficiency - continue Lopressor, Lasix   4/12/2023 Echo: Lilia Akhtar Left ventricle: The cavity size was normal. Wall thickness was normal.  Systolic function was vigorous. The estimated ejection fraction was  65-70%, by visual assessment. No diagnostic evidence for regional wall  motion abnormalities. Left ventricular diastolic function parameters were  normal for the patient's age. 7/28 pn: acute on chronic diastolic chf - iv Lasix  Discharge Summary: sob, le edema - improved with iv Lasix; acute diastolic chf -moderate aortic in sufficiency; For questions regarding this query, please contact Clinical Documentation : Chetna Garcia RN [facility] Cell# [phone]                          Thank you!                                                            THIS FORM IS A PERMANENT PART OF THE MEDICAL RECORD

## 2023-08-08 NOTE — PAYOR COMM NOTE
--------------  WE ARE STILL AWAITING YOUR DETERMINATION ON THIS INPT ADMISSION. PLEASE FAX INPT DAYS AUTHORIZED ASAP -[phone]    Baldomero Jordan!    DISCHARGE REVIEW    Payor: [facility]  Subscriber #:  [id number]  Authorization Number: [id number]    Admit date: 23  Admit time:   6:15 PM  Discharge Date: 2023  8:30 PM     Admitting Physician: Radha Nayak MD  Attending Physician:  No att. providers found  Primary Care Physician: Arelis Villegas MD          Discharge Summary Notes        Discharge Summary signed by Kevin Saenz MD at 2023  8:39 AM       Author: Kevin Saenz MD Specialty: HOSPITALIST, Internal Medicine Author Type: Physician    Filed: 2023  8:39 AM Date of Service: 2023  8:36 AM Status: Signed    : Kevin Saenz MD (Physician)           Discharge Summary     Breanne Flowers Patient Status:  Inpatient    1/3/1933 MRN [id number]   Location [facility] 2NE-A Attending No att. providers found   Hosp Day # 2 PCP Cristiane Corbin MD     Date of Admission: 2023  Date of Discharge: 2023  Discharge Disposition: Home or Self Care    Discharge Diagnosis:   Shortness of breath lower extremities edema improved with IV Lasix  Acute diastolic CHF moderate aortic insufficiency  History of CAD on aspirin beta-blocker and statin  Polymyalgia rheumatica with elevated CRP patient was recently weaned off of prednisone  Discussed with rheumatology low-dose prednisone resumed  New onset A-fib on Eliquis and Lopressor  Left inguinal cyst possible old diverticular and short course of antibiotics    History of Present Illness: 80-year-old female presents with shortness of breath lower extremities edema intermittent inguinal discomfort. Brief Synopsis: Patient improved with IV Lasix. She was seen by general surgery as well as cardiology. She was discharged on short course of antibiotics and advised to follow-up with general surgery.   She will start seeing her new primary care physician Dr. Faith Nair in October. Lace+ Score: 73  59-90 High Risk  29-58 Medium Risk  0-28   Low Risk       TCM Follow-Up Recommendation:  LACE > 58: High Risk of readmission after discharge from the hospital.    Procedures during hospitalization:       Incidental or significant findings and recommendations (brief descriptions):      Lab/Test results pending at Discharge:       Consultants:  Gen sx  cardiology    Discharge Medication List:     Discharge Medications        START taking these medications        Instructions Prescription details   amoxicillin clavulanate 875-125 MG Tabs  Commonly known as: Augmentin      Take 1 tablet by mouth 2 (two) times daily for 7 days. Stop taking on: August 4, 2023  Quantity: 14 tablet  Refills: 0            CHANGE how you take these medications        Instructions Prescription details   predniSONE 10 MG Tabs  Commonly known as: Deltasone  What changed: Another medication with the same name was removed. Continue taking this medication, and follow the directions you see here. Take in combination with the 20mg according to taper. 80mg daily x 3 days, then 70mg daily x 3 days, then 60mg daily x 3 days, then 50mg daily x 3 days, then 40mg daily. Quantity: 90 tablet  Refills: 0            CONTINUE taking these medications        Instructions Prescription details   apixaban 5 MG Tabs  Commonly known as: Eliquis      Take 1 tablet (5 mg total) by mouth 2 (two) times daily. Quantity: 60 tablet  Refills: 3     aspirin 81 MG Tbec      Take 1 tablet (81 mg total) by mouth daily. Refills: 0     atorvastatin 40 MG Tabs  Commonly known as: Lipitor      Take 1 tablet (40 mg total) by mouth at bedtime. Quantity: 90 tablet  Refills: 1     B-Complex Tabs      Take 1 tablet by mouth daily with lunch. Refills: 0     escitalopram 5 MG Tabs  Commonly known as: Lexapro      Take 1 tablet (5 mg total) by mouth daily.    Quantity: 90 tablet  Refills: 1     furosemide 20 MG Tabs  Commonly known as: Lasix      Take 1 tablet (20 mg total) by mouth daily. Stop taking on: August 27, 2023  Quantity: 30 tablet  Refills: 3     gabapentin 100 MG Caps  Commonly known as: Neurontin      Take 1 capsule (100 mg total) by mouth as needed. Refills: 0     latanoprost 0.005 % Soln  Commonly known as: Xalatan      Place 1 drop into both eyes nightly. Refills: 3     levothyroxine 75 MCG Tabs  Commonly known as: Synthroid      Take 1 tablet (75 mcg total) by mouth daily. Quantity: 90 tablet  Refills: 1     metoprolol tartrate 25 MG Tabs  Commonly known as: Lopressor      Take 1 tablet (25 mg total) by mouth 2 (two) times daily. Quantity: 180 tablet  Refills: 1     valACYclovir 500 MG Tabs  Commonly known as: Valtrex      Take 1 tablet (500 mg total) by mouth 2 (two) times daily. For shingles. Quantity: 14 tablet  Refills: 0            STOP taking these medications      meclizine 25 MG Tabs  Commonly known as: Antivert        sulfamethoxazole-trimethoprim -160 MG Tabs per tablet  Commonly known as: Bactrim DS                  Where to Get Your Medications        These medications were sent to [facility], [address] [phone], [address], [address]      Phone: [phone]   furosemide 20 MG Tabs       Please  your prescriptions at the location directed by your doctor or nurse    Bring a paper prescription for each of these medications  amoxicillin clavulanate 875-125 MG Tabs         Follow-up appointment:   Maribel Lezama, [address] [place] [address] [phone]    Follow up  As needed.  To monitor left groin hernia    Appointments for Next 30 Days 7/31/2023 - 8/30/2023        Date Arrival Time Visit Type Length Department Provider     8/3/2023  1:30 PM  EXAM - ESTABLISHED [1929] 30 min Brittany Lorenzo [place], [facility] Patient Instructions:                           Supplementary Documentation:   BTPXN reviewed: 35 minutes    Vital signs:       Physical Exam:    General:  NAD  Cardiovascular:  S1, S2    -----------------------------------------------------------------------------------------------  PATIENT DISCHARGE INSTRUCTIONS: See electronic chart    Tip: Documentation requirements: For split shared discharge, BOTH providers need to document specific floor, unit, and time spent on the discharge. The note needs to be signed by the provider with > 50% of time and bill under their NPI.    Time spent:  35 minutes        Nicole Gil MD        Electronically signed by Rosita Carmen MD on 7/31/2023  8:39 AM         REVIEWER COMMENTS

## 2023-08-12 ENCOUNTER — HOSPITAL ENCOUNTER (EMERGENCY)
Age: 88
Discharge: HOME | End: 2023-08-12
Attending: EMERGENCY MEDICINE
Payer: MEDICARE

## 2023-08-12 VITALS
BODY MASS INDEX: 29.26 KG/M2 | DIASTOLIC BLOOD PRESSURE: 99 MMHG | HEART RATE: 64 BPM | WEIGHT: 159 LBS | SYSTOLIC BLOOD PRESSURE: 165 MMHG | RESPIRATION RATE: 18 BRPM | OXYGEN SATURATION: 100 % | HEIGHT: 62 IN | TEMPERATURE: 98 F

## 2023-08-12 DIAGNOSIS — R04.0 EPISTAXIS: Primary | ICD-10-CM

## 2023-08-12 PROCEDURE — 99283 EMERGENCY DEPT VISIT LOW MDM: CPT

## 2023-08-12 PROCEDURE — 30901 CONTROL OF NOSEBLEED: CPT

## 2023-08-12 NOTE — DISCHARGE INSTRUCTIONS
Use direct pressure/nasal clamp as needed for any recurrent bleeding. Okay to do the nasal spray but maybe back off from the topical application of the mupirocin for couple of days. Call ENT on Monday to make follow-up.

## 2023-08-12 NOTE — ED QUICK NOTES
Rounding Completed    Plan of Care reviewed. Elimination needs assessed. Bed is locked and in lowest position. Call light within reach.

## 2023-08-22 NOTE — CDS QUERY
Pat Merlos     Dear Dr. Airam Liriano :  Clinical information (provided below) documents conflicting diagnoses. For accurate ICD-10-CM code assignment   BASED ON YOUR CLINICAL JUDGEMENT, PLEASE SELECT   THE MOST APPROPRIATE DIAGNOSIS:     (   ) Acute Diastolic Heart Failure   (  x ) Acute on Chronic Diastolic Heart Failure          ___________________________________     Documentation from the Medical Record:   ED Impression: acute on chronic chf notes: 1-2+ pedal edema noted bilaterally      H&P: acute on chronic diastolic chf probably 2/2 steroids, chf and venous insufficiency; left groin lump u/s pending; cad; afib -; hypothyroidism; pmr     7/27 pn: acute on chronic diastolic chf - iv Lasix; afib; cad;   7/27 Cardio: HFpEF - EF 65% by echo 4/12/23 - moderate aortic insufficiency - continue Lopressor, Lasix   4/12/2023 Echo: Kassandra Blunt Left ventricle: The cavity size was normal. Wall thickness was normal.  Systolic function was vigorous. The estimated ejection fraction was  65-70%, by visual assessment. No diagnostic evidence for regional wall  motion abnormalities. Left ventricular diastolic function parameters were  normal for the patient's age. 7/28 pn: acute on chronic diastolic chf - iv Lasix  Discharge Summary: sob, le edema - improved with iv Lasix; acute diastolic chf -moderate aortic in sufficiency; For questions regarding this query, please contact Clinical Documentation : Tyrone Suoza RN [facility] Cell# [phone]                           Thank you!                                                               THIS FORM IS A PERMANENT PART OF THE MEDICAL RECORD

## 2023-10-02 ENCOUNTER — TELEPHONE (OUTPATIENT)
Dept: RHEUMATOLOGY | Facility: CLINIC | Age: 88
End: 2023-10-02

## 2023-10-02 NOTE — TELEPHONE ENCOUNTER
Called pts daughter and left a voicemail stating she missed her 11:15 am appointment in [place] on 10/2/2023 and to call the office to reschedule.

## 2023-10-02 NOTE — TELEPHONE ENCOUNTER
Daughter called stating that she had previously called to cancel 10/2/23 appt due to pt ins will not cover visit and is questioning if her ins will be billed because they will not pay. Daughter stated that the first visit was a hospital f/u visit which the ins did cover and that Dr. Henry Posada is not in her network.

## 2024-06-10 NOTE — TELEPHONE ENCOUNTER
Called pt, JASS for pt daughter to call our office. Dr. Aneudy Goldberg would like her to decrease to 20mg prednisone due to Shingles. [facility]  Surgery Department  Operative Note    SUMMARY     Date of Procedure: 6/10/2024     Procedure: Procedure(s) (LRB):  XI ROBOTIC HYSTERECTOMY (N/A)     Surgeons and Role:     * Baltazar Stack IV, MD - Primary  CHIRSTINA Nava (No resident or staff available to assist at bedside)     * Liseth Thakur MD - Resident - Assist at console    Pre-Operative Diagnosis: PMB (postmenopausal bleeding) [N95.0]  Chronic pelvic pain in female [R10.2, G89.29]  Severe dysmenorrhea [N94.6]    Post-Operative Diagnosis: Post-Op Diagnosis Codes:     * PMB (postmenopausal bleeding) [N95.0]     * Chronic pelvic pain in female [R10.2, G89.29]     * Severe dysmenorrhea [N94.6]    Anesthesia: General endotracheal anesthesia    Technical Procedures Used:   1. Robotic assisted hysterectomy with bilateral salpingo-oophorectomy    Description of the Findings of the Procedure:   Uterus 8-10 week size.  Bilateral ovaries appeared normal.  Liver edge, gallbladder, and appendix with no visualized abnormality.  Bladder was densely adhered to anterior uterus.  Extensive lysis of adhesions was required to dissect the bladder off of the vagina to a point below the external os of the cervix identified by the KOH ring.  Bilateral ureters with normal, forward vermiculation throughout procedure and postprocedure.  Speculum exam postprocedure revealed excellent vaginal cuff closure.    Operative report:  Sameera Olivarez is a 56 year patient suffering from the above conditions. Risks, benefits, and alternatives to minimally invasive hysterectomy with bilateral salpingo-oophorectomy reviewed in detail with the patient during her preoperative visit. Consents were signed and patient was taken to the operating room today where she underwent successful general endotracheal anesthesia. Patient was prepped and draped in the usual sterile fashion for abdominal/pelvic surgery. Sterile placement of Castro catheter was performed. FREDDIE PAIZ  uterine manipulator with a KOH ring was placed. Post IZABEL placement attention was turned to the abdomen where a Veress needle was placed through the umbilicus. Correct placement was confirmed with the hanging drop test and low intra-abdominal pressures. The abdomen and pelvis were insufflated with CO2 gas. Post insufflation attention was turned to port placement.  Patient was noted to have prior abdominoplasty.  An 8 incision was made in the patient's prior umbilical incision site (supraumbilical).  An 8 mm camera port was placed without complication. Two lateral, abdominal robotic operative ports were placed under direct visualization for total of 3 ports being used for this procedure. General inspection was performed with the findings as reported above. Patient was placed in deep Trendelenburg and the robot was docked.     The bilateral round ligaments were isolated cauterized and transected.  The bilateral retroperitoneal spaces were dissected.  The anterior leaf of the broad ligament was dissected or create a bladder flap.  The bladder was dissected to a point below the external os of the cervix identified by the KOH ring of the IZABEL uterine manipulator.  Of note, bladder was densely adhered to the uterus from the patient's prior  delivery.  Lysis of adhesions was performed without complication and no evidence of iatrogenic bladder injury.  The bilateral infundibulopelvic ligaments were isolated cauterized and transected.  Multiple sealing cycles prior to transection were performed bilateral in order to obtain excellent hemostasis (prior to transection).  The posterior medial leaves of the broad ligament were dissected towards the respective, ipsilateral uterosacral ligament to allow the bilateral ureters to fall lateral and inferior to the plane of dissection required for the hysterectomy.  The bilateral uterine vessels were skeletonized and cauterized at the level of the KOH ring.  Anterior colpotomy  was made at the 12 o'clock position and worked towards the 3 and 9 o'clock positions along the KOH ring.  Posterior colpotomy was made to 6 o'clock position and worked towards the 3 and 9 o'clock positions along the KOH ring.  Bilateral uterine vessels were again cauterized/sealed and transected with excellent hemostasis being noted.  Colpotomy was completed at the 3 and 9 o'clock positions along the KOH ring.  The uterus with cervix and bilateral fallopian tubes and bilateral ovaries was removed through the vaginal cuff opening.  The vaginal cuff was then sutured using 0 Vicryl and 0 V lock 180 sutures.  0 Vicryl sutures were placed at the bilateral vaginal cuff angles in figure-of-eight fashion.  Then, using V lock 180 suture the vaginal cuff was closed in running fashion from right angle to left angle, anterior to posterior.  Once the left angle was reached the running lock was reversed and run from left to right to approximately mid vaginal cuff.  Excellent vaginal cuff closure was noted.  This was confirmed with a speculum exam postprocedure.  Two simple interrupted sutures of 0 Vicryl were strategically placed along the vaginal cuff for added support.  The pelvis was copiously irrigated and the fluid was removed via suction .  All pedicles were noted to be hemostatic.  No bleeding was noted in the retroperitoneal space.  At this point procedure was terminated.  CO2 gas was released.  Ports were removed.  Port skin sites were closed using 4-0 Monocryl suture in subcuticular fashion.  Minimal blood loss was noted during the procedure.  Clear urine was noted throughout the procedure.  Speculum exam was performed with excellent vaginal cuff closure being noted.  Patient was extubated and taken to the PACU in good condition.  All counts were correct and there were no complications.  Castro catheter remained in place for an active voiding trial to be performed prior to anticipated discharge later  today.      Operative pictures:            Significant Surgical Tasks Conducted by the Assistant(s), if Applicable:   Assist with IZABEL uterine manipulator with KOH ring placement  Assist with robotic port placement  Assist with robotic port skin site closure with 4-0 Monocryl suture subcuticular fashion     Complications: No    Estimated Blood Loss (EBL): 50 mL           Implants: * No implants in log *    Specimens:   Specimen (24h ago, onward)       Start     Ordered    06/10/24 1151  Specimen to Pathology, Surgery Gynecology and Obstetrics  Once        Comments: Pre-op Diagnosis: PMB (postmenopausal bleeding) [N95.0]Chronic pelvic pain in female [R10.2, G89.29]Severe dysmenorrhea [N94.6]Procedure(s):XI ROBOTIC HYSTERECTOMY Number of specimens: 1Name of specimens: 1. Uterus, cervix, bilateral fallopian tubes, bilateral ovaries     References:    Click here for ordering Quick Tip   Question Answer Comment   Procedure Type: Gynecology and Obstetrics    Specimen Class: Routine/Screening    Release to patient Immediate        06/10/24 1153                            Condition: Good    Disposition: PACU - hemodynamically stable.    Attestation:  I was present and performed this entire procedure    Baltazar Stack IV, MD

## 2024-12-17 ENCOUNTER — APPOINTMENT (OUTPATIENT)
Dept: CT IMAGING | Age: 89
End: 2024-12-17
Attending: EMERGENCY MEDICINE
Payer: MEDICARE

## 2024-12-17 ENCOUNTER — HOSPITAL ENCOUNTER (INPATIENT)
Facility: HOSPITAL | Age: 89
LOS: 1 days | Discharge: HOME | End: 2024-12-19
Attending: EMERGENCY MEDICINE | Admitting: INTERNAL MEDICINE
Payer: MEDICARE

## 2024-12-17 ENCOUNTER — APPOINTMENT (OUTPATIENT)
Dept: ULTRASOUND IMAGING | Age: 89
End: 2024-12-17
Attending: EMERGENCY MEDICINE
Payer: MEDICARE

## 2024-12-17 ENCOUNTER — APPOINTMENT (OUTPATIENT)
Dept: GENERAL RADIOLOGY | Age: 89
End: 2024-12-17
Attending: EMERGENCY MEDICINE
Payer: MEDICARE

## 2024-12-17 DIAGNOSIS — I82.4Y2 ACUTE DEEP VEIN THROMBOSIS (DVT) OF PROXIMAL VEIN OF LEFT LOWER EXTREMITY (HCC): Primary | ICD-10-CM

## 2024-12-17 DIAGNOSIS — R31.9 URINARY TRACT INFECTION WITH HEMATURIA, SITE UNSPECIFIED: ICD-10-CM

## 2024-12-17 DIAGNOSIS — N39.0 URINARY TRACT INFECTION WITH HEMATURIA, SITE UNSPECIFIED: ICD-10-CM

## 2024-12-17 DIAGNOSIS — R41.82 ALTERED MENTAL STATUS, UNSPECIFIED ALTERED MENTAL STATUS TYPE: ICD-10-CM

## 2024-12-17 LAB
ALBUMIN SERPL-MCNC: 3.8 G/DL (ref 3.2–4.8)
ALBUMIN/GLOB SERPL: 1.3 {RATIO} (ref 1–2)
ALP LIVER SERPL-CCNC: 74 U/L
ALT SERPL-CCNC: 26 U/L
ANION GAP SERPL CALC-SCNC: 5 MMOL/L (ref 0–18)
AST SERPL-CCNC: 32 U/L (ref ?–34)
BASOPHILS # BLD AUTO: 0.01 X10(3) UL (ref 0–0.2)
BASOPHILS NFR BLD AUTO: 0.1 %
BILIRUB SERPL-MCNC: 0.7 MG/DL (ref 0.2–0.9)
BILIRUB UR QL STRIP.AUTO: NEGATIVE
BUN BLD-MCNC: 20 MG/DL (ref 9–23)
CALCIUM BLD-MCNC: 9.5 MG/DL (ref 8.7–10.4)
CHLORIDE SERPL-SCNC: 106 MMOL/L (ref 98–112)
CLARITY UR REFRACT.AUTO: CLEAR
CO2 SERPL-SCNC: 25 MMOL/L (ref 21–32)
COLOR UR AUTO: YELLOW
CREAT BLD-MCNC: 1.3 MG/DL
EGFRCR SERPLBLD CKD-EPI 2021: 39 ML/MIN/1.73M2 (ref 60–?)
EOSINOPHIL # BLD AUTO: 0.01 X10(3) UL (ref 0–0.7)
EOSINOPHIL NFR BLD AUTO: 0.1 %
ERYTHROCYTE [DISTWIDTH] IN BLOOD BY AUTOMATED COUNT: 14.4 %
GLOBULIN PLAS-MCNC: 3 G/DL (ref 2–3.5)
GLUCOSE BLD-MCNC: 121 MG/DL (ref 70–99)
GLUCOSE UR STRIP.AUTO-MCNC: NEGATIVE MG/DL
HCT VFR BLD AUTO: 32.3 %
HGB BLD-MCNC: 10.9 G/DL
HYALINE CASTS #/AREA URNS AUTO: PRESENT /LPF
IMM GRANULOCYTES # BLD AUTO: 0.05 X10(3) UL (ref 0–1)
IMM GRANULOCYTES NFR BLD: 0.6 %
KETONES UR STRIP.AUTO-MCNC: NEGATIVE MG/DL
LYMPHOCYTES # BLD AUTO: 0.47 X10(3) UL (ref 1–4)
LYMPHOCYTES NFR BLD AUTO: 5.4 %
MCH RBC QN AUTO: 31 PG (ref 26–34)
MCHC RBC AUTO-ENTMCNC: 33.7 G/DL (ref 31–37)
MCV RBC AUTO: 91.8 FL
MONOCYTES # BLD AUTO: 0.46 X10(3) UL (ref 0.1–1)
MONOCYTES NFR BLD AUTO: 5.3 %
NEUTROPHILS # BLD AUTO: 7.71 X10 (3) UL (ref 1.5–7.7)
NEUTROPHILS # BLD AUTO: 7.71 X10(3) UL (ref 1.5–7.7)
NEUTROPHILS NFR BLD AUTO: 88.5 %
NITRITE UR QL STRIP.AUTO: POSITIVE
OSMOLALITY SERPL CALC.SUM OF ELEC: 286 MOSM/KG (ref 275–295)
PH UR STRIP.AUTO: 5.5 [PH] (ref 5–8)
PLATELET # BLD AUTO: 210 10(3)UL (ref 150–450)
POCT INFLUENZA A: NEGATIVE
POCT INFLUENZA B: NEGATIVE
POTASSIUM SERPL-SCNC: 3.6 MMOL/L (ref 3.5–5.1)
PROT SERPL-MCNC: 6.8 G/DL (ref 5.7–8.2)
RBC # BLD AUTO: 3.52 X10(6)UL
RBC #/AREA URNS AUTO: >10 /HPF
SARS-COV-2 RNA RESP QL NAA+PROBE: NOT DETECTED
SODIUM SERPL-SCNC: 136 MMOL/L (ref 136–145)
SP GR UR STRIP.AUTO: 1.01 (ref 1–1.03)
UROBILINOGEN UR STRIP.AUTO-MCNC: 1 MG/DL
WBC # BLD AUTO: 8.7 X10(3) UL (ref 4–11)
YEAST UR QL: PRESENT /HPF

## 2024-12-17 PROCEDURE — 80053 COMPREHEN METABOLIC PANEL: CPT | Performed by: EMERGENCY MEDICINE

## 2024-12-17 PROCEDURE — 93005 ELECTROCARDIOGRAM TRACING: CPT

## 2024-12-17 PROCEDURE — 99285 EMERGENCY DEPT VISIT HI MDM: CPT

## 2024-12-17 PROCEDURE — 93010 ELECTROCARDIOGRAM REPORT: CPT

## 2024-12-17 PROCEDURE — 87502 INFLUENZA DNA AMP PROBE: CPT | Performed by: EMERGENCY MEDICINE

## 2024-12-17 PROCEDURE — 87086 URINE CULTURE/COLONY COUNT: CPT | Performed by: EMERGENCY MEDICINE

## 2024-12-17 PROCEDURE — 70450 CT HEAD/BRAIN W/O DYE: CPT | Performed by: EMERGENCY MEDICINE

## 2024-12-17 PROCEDURE — 81001 URINALYSIS AUTO W/SCOPE: CPT | Performed by: EMERGENCY MEDICINE

## 2024-12-17 PROCEDURE — 93971 EXTREMITY STUDY: CPT | Performed by: EMERGENCY MEDICINE

## 2024-12-17 PROCEDURE — 96365 THER/PROPH/DIAG IV INF INIT: CPT

## 2024-12-17 PROCEDURE — 94640 AIRWAY INHALATION TREATMENT: CPT

## 2024-12-17 PROCEDURE — 87077 CULTURE AEROBIC IDENTIFY: CPT | Performed by: EMERGENCY MEDICINE

## 2024-12-17 PROCEDURE — 85025 COMPLETE CBC W/AUTO DIFF WBC: CPT | Performed by: EMERGENCY MEDICINE

## 2024-12-17 PROCEDURE — 74177 CT ABD & PELVIS W/CONTRAST: CPT | Performed by: EMERGENCY MEDICINE

## 2024-12-17 PROCEDURE — 81015 MICROSCOPIC EXAM OF URINE: CPT | Performed by: EMERGENCY MEDICINE

## 2024-12-17 PROCEDURE — 0241U SARS-COV-2/FLU A AND B/RSV BY PCR (GENEXPERT): CPT | Performed by: EMERGENCY MEDICINE

## 2024-12-17 PROCEDURE — 87186 SC STD MICRODIL/AGAR DIL: CPT | Performed by: EMERGENCY MEDICINE

## 2024-12-17 PROCEDURE — 71045 X-RAY EXAM CHEST 1 VIEW: CPT | Performed by: EMERGENCY MEDICINE

## 2024-12-17 PROCEDURE — 87040 BLOOD CULTURE FOR BACTERIA: CPT | Performed by: EMERGENCY MEDICINE

## 2024-12-17 PROCEDURE — 71275 CT ANGIOGRAPHY CHEST: CPT | Performed by: EMERGENCY MEDICINE

## 2024-12-17 RX ORDER — HEPARIN SODIUM AND DEXTROSE 10000; 5 [USP'U]/100ML; G/100ML
INJECTION INTRAVENOUS CONTINUOUS
Status: DISCONTINUED | OUTPATIENT
Start: 2024-12-18 | End: 2024-12-19

## 2024-12-17 RX ORDER — HEPARIN SODIUM AND DEXTROSE 10000; 5 [USP'U]/100ML; G/100ML
18 INJECTION INTRAVENOUS ONCE
Status: COMPLETED | OUTPATIENT
Start: 2024-12-17 | End: 2024-12-18

## 2024-12-17 RX ORDER — IPRATROPIUM BROMIDE AND ALBUTEROL SULFATE 2.5; .5 MG/3ML; MG/3ML
3 SOLUTION RESPIRATORY (INHALATION) ONCE
Status: COMPLETED | OUTPATIENT
Start: 2024-12-17 | End: 2024-12-17

## 2024-12-18 PROBLEM — R31.9 URINARY TRACT INFECTION WITH HEMATURIA, SITE UNSPECIFIED: Status: ACTIVE | Noted: 2024-12-18

## 2024-12-18 PROBLEM — N39.0 URINARY TRACT INFECTION WITH HEMATURIA, SITE UNSPECIFIED: Status: ACTIVE | Noted: 2024-12-18

## 2024-12-18 PROBLEM — R41.82 ALTERED MENTAL STATUS, UNSPECIFIED ALTERED MENTAL STATUS TYPE: Status: ACTIVE | Noted: 2024-12-18

## 2024-12-18 LAB
APTT PPP: 127.6 SECONDS (ref 23–36)
APTT PPP: 36 SECONDS (ref 23–36)
APTT PPP: 75.1 SECONDS (ref 23–36)
FLUAV + FLUBV RNA SPEC NAA+PROBE: NEGATIVE
FLUAV + FLUBV RNA SPEC NAA+PROBE: NEGATIVE
POTASSIUM SERPL-SCNC: 3.8 MMOL/L (ref 3.5–5.1)
Q-T INTERVAL: 358 MS
QRS DURATION: 80 MS
QTC CALCULATION (BEZET): 405 MS
R AXIS: -28 DEGREES
RSV RNA SPEC NAA+PROBE: POSITIVE
SARS-COV-2 RNA RESP QL NAA+PROBE: NOT DETECTED
T AXIS: 92 DEGREES
VENTRICULAR RATE: 77 BPM

## 2024-12-18 PROCEDURE — 85730 THROMBOPLASTIN TIME PARTIAL: CPT | Performed by: EMERGENCY MEDICINE

## 2024-12-18 PROCEDURE — 85730 THROMBOPLASTIN TIME PARTIAL: CPT | Performed by: STUDENT IN AN ORGANIZED HEALTH CARE EDUCATION/TRAINING PROGRAM

## 2024-12-18 PROCEDURE — 84132 ASSAY OF SERUM POTASSIUM: CPT | Performed by: STUDENT IN AN ORGANIZED HEALTH CARE EDUCATION/TRAINING PROGRAM

## 2024-12-18 RX ORDER — POLYETHYLENE GLYCOL 3350 17 G/17G
17 POWDER, FOR SOLUTION ORAL DAILY PRN
Status: DISCONTINUED | OUTPATIENT
Start: 2024-12-18 | End: 2024-12-19

## 2024-12-18 RX ORDER — POTASSIUM CHLORIDE 1500 MG/1
40 TABLET, EXTENDED RELEASE ORAL EVERY 4 HOURS
Status: COMPLETED | OUTPATIENT
Start: 2024-12-18 | End: 2024-12-18

## 2024-12-18 RX ORDER — ASPIRIN 81 MG/1
81 TABLET ORAL DAILY
Status: DISCONTINUED | OUTPATIENT
Start: 2024-12-18 | End: 2024-12-18

## 2024-12-18 RX ORDER — BISACODYL 10 MG
10 SUPPOSITORY, RECTAL RECTAL
Status: DISCONTINUED | OUTPATIENT
Start: 2024-12-18 | End: 2024-12-19

## 2024-12-18 RX ORDER — ATORVASTATIN CALCIUM 40 MG/1
40 TABLET, FILM COATED ORAL NIGHTLY
Status: DISCONTINUED | OUTPATIENT
Start: 2024-12-18 | End: 2024-12-19

## 2024-12-18 RX ORDER — ACETAMINOPHEN 500 MG
500 TABLET ORAL EVERY 4 HOURS PRN
Status: DISCONTINUED | OUTPATIENT
Start: 2024-12-18 | End: 2024-12-19

## 2024-12-18 RX ORDER — ONDANSETRON 2 MG/ML
4 INJECTION INTRAMUSCULAR; INTRAVENOUS EVERY 6 HOURS PRN
Status: DISCONTINUED | OUTPATIENT
Start: 2024-12-18 | End: 2024-12-19

## 2024-12-18 RX ORDER — ONDANSETRON 2 MG/ML
4 INJECTION INTRAMUSCULAR; INTRAVENOUS EVERY 4 HOURS PRN
Status: ACTIVE | OUTPATIENT
Start: 2024-12-18 | End: 2024-12-18

## 2024-12-18 RX ORDER — GABAPENTIN 100 MG/1
100 CAPSULE ORAL DAILY PRN
Status: DISCONTINUED | OUTPATIENT
Start: 2024-12-18 | End: 2024-12-19

## 2024-12-18 RX ORDER — FUROSEMIDE 20 MG/1
20 TABLET ORAL DAILY
COMMUNITY

## 2024-12-18 RX ORDER — METOCLOPRAMIDE HYDROCHLORIDE 5 MG/ML
5 INJECTION INTRAMUSCULAR; INTRAVENOUS EVERY 8 HOURS PRN
Status: DISCONTINUED | OUTPATIENT
Start: 2024-12-18 | End: 2024-12-19

## 2024-12-18 RX ORDER — LEVOTHYROXINE SODIUM 75 UG/1
75 TABLET ORAL
Status: DISCONTINUED | OUTPATIENT
Start: 2024-12-18 | End: 2024-12-19

## 2024-12-18 RX ORDER — FUROSEMIDE 20 MG/1
20 TABLET ORAL DAILY
Status: DISCONTINUED | OUTPATIENT
Start: 2024-12-18 | End: 2024-12-19

## 2024-12-18 RX ORDER — LATANOPROST 50 UG/ML
1 SOLUTION/ DROPS OPHTHALMIC NIGHTLY
Status: DISCONTINUED | OUTPATIENT
Start: 2024-12-18 | End: 2024-12-19

## 2024-12-18 RX ORDER — METOPROLOL TARTRATE 25 MG/1
25 TABLET, FILM COATED ORAL
Status: DISCONTINUED | OUTPATIENT
Start: 2024-12-18 | End: 2024-12-19

## 2024-12-18 RX ORDER — SENNOSIDES 8.6 MG
17.2 TABLET ORAL NIGHTLY PRN
Status: DISCONTINUED | OUTPATIENT
Start: 2024-12-18 | End: 2024-12-19

## 2024-12-18 RX ORDER — ESCITALOPRAM OXALATE 5 MG/1
5 TABLET ORAL DAILY
Status: DISCONTINUED | OUTPATIENT
Start: 2024-12-18 | End: 2024-12-19

## 2024-12-18 NOTE — ED QUICK NOTES
Orders for admission, patient is aware of plan and ready to go upstairs. Any questions, please call ED RN Mely at extension [phone].     Patient Covid vaccination status: Fully vaccinated     COVID Test Ordered in ED: Rapid SARS-CoV-2 by PCR    COVID Suspicion at Admission: N/A    Running Infusions:    continuous dose heparin      20 G LAC- heparin infusing at 11 ml/hr    Mental Status/LOC at time of transport: Oriented    Other pertinent information:   Patient has gone through multiple Ivs here, will get out of bed and dress herself, pulled out luciano-wick multiple times.     RSV +    CIWA score: N/A   NIH score:  N/A

## 2024-12-18 NOTE — PLAN OF CARE
NURSING ADMISSION NOTE      Patient admitted via Cart  Oriented to room. A&Ox4, 2L NC, NSR on tele, VSS  Safety precautions initiated.  Bed in low position.  Call light in reach.  Denied pain   Voiding via purewick   Heparin gtt infusing per order   Safety precautions set in place, all questions answered, call light within reach

## 2024-12-18 NOTE — ED PROVIDER NOTES
Care was assumed at shift change with plan for follow-up CT read.  These results were reviewed as noted below.  If CT brain negative for acute intracranial hemorrhage, proceed with heparin.  Results reviewed as noted below.    CT head  CTA chest abdomen and pelvis dissection protocol      IMPRESSION:  CT head:  No acute intracranial findings  Global cerebral volume loss and mild chronic microvascular changes in white matter    CTA chest:  Diagnostic quality exam  No appreciable acute thoracic aortic pathology on this non-ECG gated exam  No dissection or intramural hematoma  4.6 cm ascending aortic aneurysm    Coronary and systemic atherosclerotic calcifications    Cardiomegaly      No appreciable pulmonary embolus to the segmental pulmonary arterial level      Bilateral airway thickening with mucus plugging and lung bases  Patchy groundglass opacities and centrilobular nodules in the lower lobes, left greater than right  May signify early airway based infection.  Microaspiration could be considered in the appropriate context    No evidence for consolidative pneumonia at this time  No signs of pulmonary edema    No pleural effusion or pneumothorax    Evidence for prior granulomatous disease with right lower lobe calcified granuloma and calcified right hilar nodes       CT abdomen and pelvis:  No acute findings in the abdomen and pelvis    No AAA  Major intra-abdominal aortic branches are patent without evidence for critical stenosis    Colonic diverticulosis without evidence for diverticulitis  Hysterectomy  Bilateral renal cysts  Degenerative changes in the spine, pelvis, and hips  Generalized osteopenia

## 2024-12-18 NOTE — ED INITIAL ASSESSMENT (HPI)
Cough for 9 days with exposure to RSV. Son states she has AMS Sunday. Did a home UA test and showed positive for uti.

## 2024-12-18 NOTE — ED PROVIDER NOTES
Patient Seen in: [facility] In [place]      History     Chief Complaint   Patient presents with    Urinary Symptoms    Cough     Stated Complaint: cough x 9 days, was exposed to RSV, did a home test for UTI and was positive    Subjective:   HPI      This is a 89-year-old female who had a cough for the last 9 days.  The patient states that she was exposed to somebody with RSV.  Son who is here states that she had an episode of altered mental status on Sunday she thought she was in the garage when she was actually up in her room.  Patient had a home urine test that was positive.  The patient has had this cough and intermittent wheezing according to the family.  She is on Eliquis she denies any chest pain she denies any falls or trauma she has had a previous history of a heart attack hyperlipidemia high blood pressure thyroid problems.  She arrives here in the family states that she is back to normal at this present time but she did have the episode of 24 hours where she was confused.  No fevers at home no chest pain or shortness of breath.    Objective:     Past Medical History:    Disorder of thyroid    Essential hypertension    Heart attack (HCC)    High blood pressure    HYPERLIPIDEMIA    Hyperlipidemia    HYPOTHYROIDISM    Thyroid disease    Visual impairment              Past Surgical History:   Procedure Laterality Date    Cath drug eluting stent      Hernia surgery  at age 25    ?ventral hernia    Hysterectomy                  Social History     Socioeconomic History    Marital status:     Number of children: 3   Occupational History    Occupation: Retired   Tobacco Use    Smoking status: Former     Types: Cigarettes    Smokeless tobacco: Never    Tobacco comments:     quit at age 25   Vaping Use    Vaping status: Never Used   Substance and Sexual Activity    Alcohol use: No    Drug use: No   Social History Narrative    Retired. . 3 children.                  Physical Exam      ED Triage Vitals [12/17/24 2027]   /73   Pulse 85   Resp 16   Temp 98.7 °F (37.1 °C)   Temp src    SpO2 96 %   O2 Device None (Room air)       Current Vitals:   Vital Signs  BP: 123/73  Pulse: 85  Resp: 16  Temp: 98.7 °F (37.1 °C)    Oxygen Therapy  SpO2: 96 %  O2 Device: None (Room air)        Physical Exam  General: .  Patient is in no respiratory distress.  At this present time.  She sounds congested.  No signs of trauma to head or neck.      HEENT: Atraumatic, conjunctiva are not pale.  There is no icterus.  Oral mucosa Is wet.  No facial trauma.  The neck is supple.    LUNGS: Coarse breath sounds at the lungs.  With some end expiratory wheezing.    CV: Cardiovascular is regular without murmurs or rubs.    ABD: The abdomen is soft nondistended nontender.  There is no rebound.  There is no guarding.    EXT: There is good pulses bilaterally.  There is no calf tenderness on the right [place] calf tenderness is noted.  There is no rash noted.  There is no edema    NEURO: Alert and oriented x4.  Muscle strength and sensory exam is grossly normal.  And the patient is neurologically intact with no focal findings.  Cranial nerves are grossly intact she is got no pronator drift.  Muscle strength since exam is normal.    ED Course     Labs Reviewed   COMP METABOLIC PANEL (14) - Abnormal; Notable for the following components:       Result Value    Glucose 121 (*)     Creatinine 1.30 (*)     eGFR-Cr 39 (*)     All other components within normal limits   CBC WITH DIFFERENTIAL WITH PLATELET - Abnormal; Notable for the following components:    RBC 3.52 (*)     HGB 10.9 (*)     HCT 32.3 (*)     Neutrophil Absolute Prelim 7.71 (*)     Neutrophil Absolute 7.71 (*)     Lymphocyte Absolute 0.47 (*)     All other components within normal limits   URINALYSIS WITH CULTURE REFLEX - Abnormal; Notable for the following components:    Blood Urine Moderate (*)     Protein Urine 30 mg/dL (*)     Urobilinogen Urine 1.0 (*)      Nitrite Urine Positive (*)     Leukocyte Esterase Urine Trace (*)     All other components within normal limits   UA MICROSCOPIC ONLY, URINE - Abnormal; Notable for the following components:    RBC Urine >10 (*)     Bacteria Urine 2+ (*)     Squamous Epi. Cells Moderate (*)     Hyaline Casts Present (*)     Yeast Urine Present (*)     All other components within normal limits   RAPID SARS-COV-2 BY PCR - Normal   POCT FLU TEST - Normal    Narrative:     This assay is a rapid molecular in vitro test utilizing nucleic acid amplification of influenza A and B viral RNA.   BLOOD CULTURE   BLOOD CULTURE   SARS-COV-2/FLU A AND B/RSV BY PCR (GENEXPERT)   URINE CULTURE, ROUTINE                 The patient was placed on monitors, IV was started, blood was drawn.  Left calf tenderness noted.  She says is chronic for her.  The right calf is nontender.     Workup was done to rule out pneumonia, UTI, intracranial bleed  MDM      The EKG shows atrial fibs there is some nonspecific ST changes noted.  No acute ST elevation there is no acute ST elevations or ischemic findings.  The rest of the EKG including rate rhythm axis and intervals I agree with the EKG report . The rate is 77 when compared to an old EKG the atrial for was seen previously in July 2023 she is on Eliquis.    There is no significant changes compared to the old EKG.  Urinalysis did show positive nitrates, leuks the urine was sent for culture, CBC shows a white count of 8.7 hemoglobin 10, comprehensive was grossly normal.  Blood cultures obtained, urine was sent for culture.  I personally reviewed the radiographs and my individual interpretation shows  Findings of possible early pneumonia left lower lobe.,  Atelectasis left lower lobe.    Also reviewed official report and it shows      US VENOUS DOPPLER LEG LEFT - DIAG IMG (CPT=93971)    Result Date: 12/17/2024  PROCEDURE:  US VENOUS DOPPLER LEG LEFT - DIAG IMG (CPT=93971)  COMPARISON:  None.  INDICATIONS:  Left leg  pain and swelling  TECHNIQUE:  Real time, grey scale, and duplex ultrasound was used to evaluate the lower extremity venous system. B-mode two-dimensional images of the vascular structures, Doppler spectral analysis, and color flow.  Doppler imaging were performed.  The following veins were imaged:  Common, deep, and superficial femoral, popliteal, sapheno-femoral junction, posterior tibial veins, and the contralateral common femoral vein.  PATIENT STATED HISTORY: (As transcribed by Technologist)  Patient presents with left leg swelling.    FINDINGS:  EXTREMITY EXAMINED:  Lower extremity SAPHENOFEMORAL JUNCTION:  No reflux. THROMBI:  Occlusive thrombus identified within the proximal segment of the left posterior tibial veins COMPRESSION:  Incomplete vessel compression of the left posterior tibial veins proximally.  Remaining deep venous segments maintain normal compressibility and augmentation. OTHER:  Negative.            CONCLUSION:  Findings of acute deep venous thrombosis with occlusive thrombus identified within the proximal segment of the left posterior tibial veins.  Findings discussed with Dr. Owusu at 11:30 p.m. Central standard time on 12/17/2024.   LOCATION:  [facility]    Dictated by (CST): Duy Portillo MD on 12/17/2024 at 11:30 PM     Finalized by (CST): Duy Portillo MD on 12/17/2024 at 11:33 PM       XR CHEST AP PORTABLE  (CPT=71045)    Result Date: 12/17/2024  PROCEDURE:  XR CHEST AP PORTABLE  (CPT=71045)  TECHNIQUE:  AP chest radiograph was obtained.  COMPARISON:  [facility] , XR, XR CHEST AP PORTABLE  (CPT=71045), 7/26/2023, 2:19 PM.  INDICATIONS:  cough x 9 days, was exposed to RSV, did a home test for UTI and was positive  PATIENT STATED HISTORY: (As transcribed by Technologist)  Patient has a phelgm cough for 9 days and shortness of breath. Patient was exposed to RSV. Patient is postive for UTI.               CONCLUSION:   Stable cardiac and mediastinal contours.  Chronic interstitial thickening with  mild patchy left basilar opacity, possibly atelectatic or inflammatory.  No significant pleural effusion or appreciable pneumothorax.   LOCATION:  [facility]      Dictated by (CST): Duy Portillo MD on 12/17/2024 at 11:08 PM     Finalized by (CST): Duy Portillo MD on 12/17/2024 at 11:08 PM      The patient's case was discussed with the [facility] hematologist Dr. Gonzalez he did feel that the patient should be heparinized without a bolus.  But he did want a CT of the brain to before we start the heparin as the patient had been confused and that the confusion may have been due to a urinary tract infection did show 2+ bacteria although there is no significant white cells and urine will be sent for culture.  Will treat for presumptive pneumonia, urinary tract infection.  She is alert and oriented and has does not look septic at this present time she is not hypoxic.  Will also get a CTA chest per hematology to rule out pulmonary embolism or any intra-abdominal pathology.  [facility].  Will sign this CT out to my partners of the CT does show any acute pathology patient will be from the hospitalist.  Treated.  But at this present time of the CT brain shows no acute pathology she will have heparin without a bolus  Medical Decision Making      Disposition and Plan     Clinical Impression:  1. Acute deep vein thrombosis (DVT) of proximal vein of left lower extremity (HCC)    2. Urinary tract infection with hematuria, site unspecified    3. Altered mental status, unspecified altered mental status type         Disposition:  There is no disposition on file for this visit.  There is no disposition time on file for this visit.    Follow-up:  No follow-up provider specified.        Medications Prescribed:  Current Discharge Medication List              Supplementary Documentation:

## 2024-12-18 NOTE — H&P
[facility] Hospitalist History and Physical      Chief Complaint   Patient presents with    Urinary Symptoms    Cough        PCP: Kostas Bain MD      History of Present Illness: Patient is a 89 year old female with PMH sig for A-fib on Eliquis, polymyalgia rheumatica, hypertension, hyperlipidemia, hypothyroidism, CAD s/p PCI who presents for cough.  Patient states she has been experiencing a cough and sore throat for about 2 weeks.  She is also been experiencing leg pain and swelling.  Lower extremity duplex ultrasound revealed a DVT in the left posterior tibial vein.  Patient states she had not missed a dose of her medications.  States she lives at home with her daughter.  Hematology was consulted.  Patient was started on heparin drip.  CTA negative for PE.    Past Medical History:    Disorder of thyroid    Essential hypertension    Glaucoma    Heart attack (HCC)    High blood pressure    HYPERLIPIDEMIA    Hyperlipidemia    HYPOTHYROIDISM    Thyroid disease    Visual impairment      Past Surgical History:   Procedure Laterality Date    Cath drug eluting stent      Hernia surgery  at age 25    ?ventral hernia    Hysterectomy          ALL:  Allergies[1]     No current outpatient medications on file.       Social History     Tobacco Use    Smoking status: Former     Types: Cigarettes    Smokeless tobacco: Never    Tobacco comments:     quit at age 25     Not sure how much she used to smoke   Substance Use Topics    Alcohol use: No        Fam Hx  Family History   Problem Relation Age of Onset    Diabetes Other        Review of Systems  Comprehensive ROS reviewed and negative except for what is stated in HPI.      OBJECTIVE:  BP (!) 156/95 (BP Location: Right arm)   Pulse 90   Temp 98.8 °F (37.1 °C) (Oral)   Resp 16   Ht 5' 2\" (1.575 m)   Wt 140 lb (63.5 kg)   SpO2 93%   BMI 25.61 kg/m²   Physical Exam:  General: Alert, awake, cooperative.  HEENT:  Normocephalic, atraumatic.  Neck:  Trachea  midline.  No JVD.   Chest:  Clear to auscultation bilaterally. No wheezes, rales, or rhonchi.  CV: Irregular rhythm.  Positive S1/S2.   GI: Bowel sounds present in all four quadrants, abdomen is soft, non-tender, non-distended.  Extremities:   Trace bilateral lower extremity.  No cyanosis.  Neurological:  AAOx3.  Moving all extremities.  Skin:  Warm and dry.        Data Review:    LABS:   Lab Results   Component Value Date    WBC 8.7 12/17/2024    HGB 10.9 12/17/2024    HCT 32.3 12/17/2024    .0 12/17/2024    CREATSERUM 1.30 12/17/2024    BUN 20 12/17/2024     12/17/2024    K 3.6 12/17/2024     12/17/2024    CO2 25.0 12/17/2024     12/17/2024    CA 9.5 12/17/2024    ALB 3.8 12/17/2024    ALKPHO 74 12/17/2024    BILT 0.7 12/17/2024    TP 6.8 12/17/2024    AST 32 12/17/2024    ALT 26 12/17/2024    .6 12/18/2024       CTA: Mild bilateral airspace opacities.  No PE.    Radiology: CTA CHEST + CT ABD (W) + CT PEL (W) (CPT=71275/71069)    Result Date: 12/18/2024  PROCEDURE:  CTA CHEST + CT ABD (W) + CT PEL (W) SH(CPT=71275/91290)  COMPARISON:  [facility] , CT, CTA ABD PEL JIMBO LEG RUNOFF DIAPH TO TOES IV CONTRAST(CPT=75635), 7/28/2023, 9:57 AM.  INDICATIONS:  cough x 9 days, was exposed to RSV, did a home test for UTI and was positive  TECHNIQUE:  CT of the chest (with CTA imaging), abdomen, and pelvis were obtained post- injection of non-ionic intravenous contrast material. Multi-planar reformatted/3-D images were created to optimize visualization of vascular anatomy.  Dose reduction techniques were used. Dose information is transmitted to the ACR (American College of Radiology) NRDR (National Radiology Data Registry) which includes the Dose Index Registry.  PATIENT STATED HISTORY:(As transcribed by Technologist)  cough x 9 days, was exposed to RSV, did a home test for UTI and was positive   CONTRAST USED:  100cc of Isovue 370  FINDINGS:   CHEST:  LUNGS:  Mucous plugging and consolidative  opacity within the bilateral lower lobes, left greater than right.  There is mild diffuse bronchial wall thickening.  Scattered nodular opacities are present.  No pneumothorax.  The central airways are patent. MEDIASTINUM:  No mass or adenopathy.  KUN:  No mass or adenopathy.  CARDIAC:  Coronary artery calcifications are present.  Mild to moderate cardiac enlargement.  No pericardial effusion or thickening. PLEURA:  No effusion CHEST WALL:  No mass or axillary adenopathy.  AORTA:  Ascending aorta is dilated measuring up to 4.2 cm.  There is atherosclerotic disease. VASCULATURE:  No visible pulmonary arterial thrombus or attenuation.   ABDOMEN/PELVIS: LIVER:  No enlargement, atrophy, abnormal density, or significant focal lesion.  BILIARY:  No visible dilatation or calcification.  PANCREAS:  No lesion, fluid collection, ductal dilatation, or atrophy.  SPLEEN:  No enlargement or focal lesion.  KIDNEYS:  No hydronephrosis or nephrolithiasis.  No enhancing lesions.  There are bilateral renal cysts, largest is in the left kidney measuring 4.8 x 4.7 cm with mild calcifications (Bosniak 2). ADRENALS:  No mass or enlargement.  AORTA:  Atherosclerotic disease without aneurysm. RETROPERITONEUM:  No mass or adenopathy.  BOWEL/MESENTERY:  No dilated bowel or wall thickening.  Colonic diverticulosis, without diverticulitis.  Normal appendix. ABDOMINAL WALL:  No mass or hernia.  URINARY BLADDER:  No visible focal wall thickening, lesion, or calculus.  PELVIC NODES:  No adenopathy.  PELVIC ORGANS:  Hysterectomy BONES:  Degenerative changes of the spine.            CONCLUSION:  1. Bibasilar airspace opacities, left greater than right, correlate clinically for underlying pneumonia and/or aspiration.  Additional diffuse nodular opacities, which may also be infectious/inflammatory.  Recommend post treatment follow-up to assess for  resolution. 2. No evidence of pulmonary embolism. 3. Ascending aortic aneurysm measuring 4.2 cm. 4.  Colonic diverticulosis. 5. 4.8 cm Bosniak 2 left renal cyst.   Preliminary report provided by [facility] at time of examination.     LOCATION:  [facility]   Dictated by (CST): Arnulfo Watters MD on 12/18/2024 at 6:56 AM     Finalized by (CST): Arnulfo Watters MD on 12/18/2024 at 7:03 AM       CT BRAIN OR HEAD (CPT=70450)    Result Date: 12/18/2024  PROCEDURE:  CT BRAIN OR HEAD (60570)  COMPARISON:  [facility] , CT, CT BRAIN OR HEAD (72536), 6/13/2023, 6:17 AM.  INDICATIONS:  cough x 9 days, was exposed to RSV, did a home test for UTI and was positive  TECHNIQUE:  Noncontrast CT scanning is performed through the brain. Dose reduction techniques were used. Dose information is transmitted to the ACR (American College of Radiology) NRDR (National Radiology Data Registry) which includes the Dose Index Registry.  PATIENT STATED HISTORY: (As transcribed by Technologist)  cough x 9 days, was exposed to RSV, did a home test for UTI and was positive.    FINDINGS:  There is cerebral atrophy with symmetric prominence of the ventricles. There are patchy areas of low attenuation in the periventricular and deep white matter which are nonspecific but most consistent with small vessel ischemic changes. There is no evidence of hemorrhage, mass, midline shift, or extra-axial fluid collection.  The visualized paranasal sinuses show mucosal thickening in the ethmoid sinuses. No evidence of depressed skull fracture.            CONCLUSION: 1. No acute intracranial findings 2. Cerebral atrophy with chronic microvascular ischemic changes.  Preliminary report provided by [facility] at time of examination.     LOCATION:  [facility]   Dictated by (CST): Arnulfo Watters MD on 12/18/2024 at 6:23 AM     Finalized by (CST): Arnulfo Watters MD on 12/18/2024 at 6:24 AM       US VENOUS DOPPLER LEG LEFT - DIAG IMG (CPT=93971)    Result Date: 12/17/2024  PROCEDURE:  US VENOUS DOPPLER LEG LEFT - DIAG IMG (CPT=93971)  COMPARISON:  None.  INDICATIONS:  Left  leg pain and swelling  TECHNIQUE:  Real time, grey scale, and duplex ultrasound was used to evaluate the lower extremity venous system. B-mode two-dimensional images of the vascular structures, Doppler spectral analysis, and color flow.  Doppler imaging were performed.  The following veins were imaged:  Common, deep, and superficial femoral, popliteal, sapheno-femoral junction, posterior tibial veins, and the contralateral common femoral vein.  PATIENT STATED HISTORY: (As transcribed by Technologist)  Patient presents with left leg swelling.    FINDINGS:  EXTREMITY EXAMINED:  Lower extremity SAPHENOFEMORAL JUNCTION:  No reflux. THROMBI:  Occlusive thrombus identified within the proximal segment of the left posterior tibial veins COMPRESSION:  Incomplete vessel compression of the left posterior tibial veins proximally.  Remaining deep venous segments maintain normal compressibility and augmentation. OTHER:  Negative.            CONCLUSION:  Findings of acute deep venous thrombosis with occlusive thrombus identified within the proximal segment of the left posterior tibial veins.  Findings discussed with Dr. Owusu at 11:30 p.m. Central standard time on 12/17/2024.   LOCATION:  [facility]    Dictated by (CST): Duy Portillo MD on 12/17/2024 at 11:30 PM     Finalized by (CST): Duy Portillo MD on 12/17/2024 at 11:33 PM       XR CHEST AP PORTABLE  (CPT=71045)    Result Date: 12/17/2024  PROCEDURE:  XR CHEST AP PORTABLE  (CPT=71045)  TECHNIQUE:  AP chest radiograph was obtained.  COMPARISON:  [facility] , XR, XR CHEST AP PORTABLE  (CPT=71045), 7/26/2023, 2:19 PM.  INDICATIONS:  cough x 9 days, was exposed to RSV, did a home test for UTI and was positive  PATIENT STATED HISTORY: (As transcribed by Technologist)  Patient has a phelgm cough for 9 days and shortness of breath. Patient was exposed to RSV. Patient is postive for UTI.               CONCLUSION:   Stable cardiac and mediastinal contours.  Chronic interstitial thickening  with mild patchy left basilar opacity, possibly atelectatic or inflammatory.  No significant pleural effusion or appreciable pneumothorax.   LOCATION:  [facility]      Dictated by (CST): Duy Portillo MD on 12/17/2024 at 11:08 PM     Finalized by (CST): Duy Portillo MD on 12/17/2024 at 11:08 PM          Assessment/Plan:   89 year old female with PMH sig for A-fib on Eliquis, polymyalgia rheumatica, hypertension, hyperlipidemia, hypothyroidism, CAD s/p PCI who presents for cough.      #LLE DVT  -Pain control as needed  -On heparin drip  -Hematology consulted for further recommendations regarding anticoagulation    #Acute cystitis  -Previous urine cultures reviewed.  Continue Rocephin.    #RSV infection  -On room air  -Continue supportive care    #CAD s/p PCI  #Hypertension  #Hyperlipidemia  #Lower extremity edema  -Continue statin, metoprolol, Lasix  -Not on aspirin due to history of epistaxis    #Hypothyroidism  -Continue levothyroxine    #Depression  -Continue Lexapro    CODE STATUS: DNR/DNI  DVT prophylaxis: Heparin gtt    Discussed with RN and patient's daughter      Outpatient records or previous hospital records reviewed.   [facility] hospitalist to continue to follow patient while in house  A total of 76 minutes taken with patient and coordinating care.  Greater than 50% face to face encounter.      [facility] Hospitalist      **Certification      PHYSICIAN Certification of Need for Inpatient Hospitalization - Initial Certification    Patient will require inpatient services that will reasonably be expected to span two midnight's based on the clinical documentation in H+P.   Based on patients current state of illness, I anticipate that, after discharge, patient will require TBD.          [1] Not on File

## 2024-12-18 NOTE — CONSULTS
[facility]  Report of Consultation    Rachelle Larsen Patient Status:  Inpatient    1/3/1933 MRN [id number]   Location [facility] 7NE-A Attending Romulo Sunshine MD   Hosp Day # 0 PCP Kostas Bain MD     Reason for Consultation:  LLE DVT    History of Present Illness:  This is a 89 year old with a past medical history significant for hypothyroidism, HTN, HLD, CAD, polymyalgia rheumatica, atrial fibrillation on Eliquis who presents for cough/sore throat found to have LLE DVT.     Per chart review, the patient has been on Eliquis 5 mg BID for atrial fibrillation since ~2023. She notes that recently she was experiencing cough and an episode of altered mental status. She notes that she was also noticing that her LE was swollen and painful which was new. LLE Doppler on 24 showed likely new posterior tibial vein, CT Chest was negative for PE. She notes that she takes her medications as prescribed, she does not think she misses doses of medications, no recent plane/car rides, isn't very sedentary.    History:  Past Medical History:    Disorder of thyroid    Essential hypertension    Glaucoma    Heart attack (HCC)    High blood pressure    HYPERLIPIDEMIA    Hyperlipidemia    HYPOTHYROIDISM    Thyroid disease    Visual impairment     Past Surgical History:   Procedure Laterality Date    Cath drug eluting stent      Hernia surgery  at age 25    ?ventral hernia    Hysterectomy       Family History   Problem Relation Age of Onset    Diabetes Other       reports that she has quit smoking. Her smoking use included cigarettes. She has never used smokeless tobacco. She reports that she does not drink alcohol and does not use drugs.    Allergies:  Allergies[1]    Medications:    Current Facility-Administered Medications:     ondansetron (Zofran) 4 MG/2ML injection 4 mg, 4 mg, Intravenous, Q4H PRN    acetaminophen (Tylenol Extra Strength) tab 500 mg, 500 mg, Oral, Q4H PRN    melatonin tab 3 mg, 3 mg, Oral,  Nightly PRN    polyethylene glycol (PEG 3350) (Miralax) 17 g oral packet 17 g, 17 g, Oral, Daily PRN    sennosides (Senokot) tab 17.2 mg, 17.2 mg, Oral, Nightly PRN    bisacodyl (Dulcolax) 10 MG rectal suppository 10 mg, 10 mg, Rectal, Daily PRN    ondansetron (Zofran) 4 MG/2ML injection 4 mg, 4 mg, Intravenous, Q6H PRN    metoclopramide (Reglan) 5 mg/mL injection 5 mg, 5 mg, Intravenous, Q8H PRN    cefTRIAXone (Rocephin) 1 g in sodium chloride 0.9% 100 mL IVPB-ADDV, 1 g, Intravenous, Once    heparin (Porcine) 90360 units/250 mL infusion ED (PE/DVT/THROMBUS) INITIAL DOSE, 18 Units/kg/hr, Intravenous, Once    heparin (Porcine) 55479 units/250mL infusion PE/DVT/THROMBUS CONTINUOUS, 200-3,000 Units/hr, Intravenous, Continuous    Review of Systems:  A 10-point review of systems was done with pertinent positives and negatives per the HPI.    Physical Exam:   Blood pressure 154/90, pulse 82, temperature 98 °F (36.7 °C), temperature source Oral, resp. rate 16, height 5' 2\" (1.575 m), weight 140 lb (63.5 kg), SpO2 96%.  General: Patient is alert and oriented x 3, not in acute distress.  Vital Signs: /90 (BP Location: Right arm)   Pulse 82   Temp 98 °F (36.7 °C) (Oral)   Resp 16   Ht 5' 2\" (1.575 m)   Wt 140 lb (63.5 kg)   SpO2 96%   BMI 25.61 kg/m²   HEENT: EOMs intact. PERRL. Oropharynx is clear.   Neck: No palpable lymphadenopathy. Neck is supple.  Chest: Clear to auscultation.  Heart: Regular rate and rhythm.   Abdomen: Soft, non tender with good bowel sounds.  Extremities: Pedal pulses are present. No edema.  Neurological: Grossly intact.   Lymphatics: There is no palpable lymphadenopathy throughout in the cervical or supraclavicular, regions.    Laboratory Data:    Lab Results   Component Value Date    WBC 8.7 12/17/2024    HGB 10.9 12/17/2024    HCT 32.3 12/17/2024    .0 12/17/2024    CREATSERUM 1.30 12/17/2024    BUN 20 12/17/2024     12/17/2024    K 3.6 12/17/2024     12/17/2024     CO2 25.0 12/17/2024     12/17/2024    CA 9.5 12/17/2024    ALB 3.8 12/17/2024    ALKPHO 74 12/17/2024    BILT 0.7 12/17/2024    TP 6.8 12/17/2024    AST 32 12/17/2024    ALT 26 12/17/2024    PTT 36.0 12/18/2024       Imaging:  N/A    Impression and Plan:    This is a 89 year old with a past medical history significant for hypothyroidism, HTN, HLD, CAD, polymyalgia rheumatica, atrial fibrillation on Eliquis who presents for cough/sore throat found to have LLE DVT.     LLE DVT: History as above; In brief, the patient has been on Eliquis 5 mg BID for atrial fibrillation since ~7/2023. She notes that recently she was experiencing cough and an episode of altered mental status. She notes that she was also noticing that her LE was swollen and painful which was new. LLE Doppler on 12/17/24 showed likely new posterior tibial vein, CT Chest was negative for PE. She notes that she takes her medications as prescribed, she does not think she misses doses of medications, no recent plane/car rides, isn't very sedentary.  - I discussed with the patient that it is unclear of her etiology for new DVT if she was taking medications as prescribed but for now she is currently on heparin gtt.   - I discussed that we will continue this until tomorrow AM and if pain is resolving, will switch to Xarelto starter pack and Xarelto maintenance thereafter  - will repeat LLE Doppler in 3/2025 as outpatient  - I have a low suspicion that she has an underlying hypercoagulability mutation that caused this     I will continue to follow while inpatient. Please do not hesitate to contact me directly with any specific questions or concerns.    Romulo Sunshine MD  [facility]  Department of Oncology and Hematology  12/18/2024  7:09 AM         [1] Not on File

## 2024-12-19 VITALS
WEIGHT: 140 LBS | HEART RATE: 96 BPM | HEIGHT: 62 IN | OXYGEN SATURATION: 93 % | BODY MASS INDEX: 25.76 KG/M2 | DIASTOLIC BLOOD PRESSURE: 103 MMHG | SYSTOLIC BLOOD PRESSURE: 153 MMHG | RESPIRATION RATE: 18 BRPM | TEMPERATURE: 99 F

## 2024-12-19 LAB
ANION GAP SERPL CALC-SCNC: 8 MMOL/L (ref 0–18)
BASOPHILS # BLD AUTO: 0.02 X10(3) UL (ref 0–0.2)
BASOPHILS NFR BLD AUTO: 0.3 %
BUN BLD-MCNC: 15 MG/DL (ref 9–23)
CALCIUM BLD-MCNC: 8.8 MG/DL (ref 8.7–10.4)
CHLORIDE SERPL-SCNC: 108 MMOL/L (ref 98–112)
CO2 SERPL-SCNC: 24 MMOL/L (ref 21–32)
CREAT BLD-MCNC: 1.16 MG/DL
EGFRCR SERPLBLD CKD-EPI 2021: 45 ML/MIN/1.73M2 (ref 60–?)
EOSINOPHIL # BLD AUTO: 0.17 X10(3) UL (ref 0–0.7)
EOSINOPHIL NFR BLD AUTO: 2.2 %
ERYTHROCYTE [DISTWIDTH] IN BLOOD BY AUTOMATED COUNT: 14.1 %
GLUCOSE BLD-MCNC: 84 MG/DL (ref 70–99)
HCT VFR BLD AUTO: 29.1 %
HGB BLD-MCNC: 9.6 G/DL
IMM GRANULOCYTES # BLD AUTO: 0.04 X10(3) UL (ref 0–1)
IMM GRANULOCYTES NFR BLD: 0.5 %
INR BLD: 1.58 (ref 0.8–1.2)
LYMPHOCYTES # BLD AUTO: 0.61 X10(3) UL (ref 1–4)
LYMPHOCYTES NFR BLD AUTO: 8 %
MCH RBC QN AUTO: 29.5 PG (ref 26–34)
MCHC RBC AUTO-ENTMCNC: 33 G/DL (ref 31–37)
MCV RBC AUTO: 89.5 FL
MONOCYTES # BLD AUTO: 0.46 X10(3) UL (ref 0.1–1)
MONOCYTES NFR BLD AUTO: 6 %
NEUTROPHILS # BLD AUTO: 6.37 X10 (3) UL (ref 1.5–7.7)
NEUTROPHILS # BLD AUTO: 6.37 X10(3) UL (ref 1.5–7.7)
NEUTROPHILS NFR BLD AUTO: 83 %
OSMOLALITY SERPL CALC.SUM OF ELEC: 290 MOSM/KG (ref 275–295)
PLATELET # BLD AUTO: 208 10(3)UL (ref 150–450)
POTASSIUM SERPL-SCNC: 3.6 MMOL/L (ref 3.5–5.1)
PROTHROMBIN TIME: 19 SECONDS (ref 11.6–14.8)
RBC # BLD AUTO: 3.25 X10(6)UL
SODIUM SERPL-SCNC: 140 MMOL/L (ref 136–145)
WBC # BLD AUTO: 7.7 X10(3) UL (ref 4–11)

## 2024-12-19 PROCEDURE — 80048 BASIC METABOLIC PNL TOTAL CA: CPT | Performed by: STUDENT IN AN ORGANIZED HEALTH CARE EDUCATION/TRAINING PROGRAM

## 2024-12-19 PROCEDURE — 85025 COMPLETE CBC W/AUTO DIFF WBC: CPT | Performed by: STUDENT IN AN ORGANIZED HEALTH CARE EDUCATION/TRAINING PROGRAM

## 2024-12-19 PROCEDURE — 85610 PROTHROMBIN TIME: CPT | Performed by: STUDENT IN AN ORGANIZED HEALTH CARE EDUCATION/TRAINING PROGRAM

## 2024-12-19 RX ORDER — POTASSIUM CHLORIDE 1500 MG/1
40 TABLET, EXTENDED RELEASE ORAL EVERY 4 HOURS
Status: DISPENSED | OUTPATIENT
Start: 2024-12-19 | End: 2024-12-19

## 2024-12-19 NOTE — PROGRESS NOTES
[facility]  Progress Note    Rachelle Larsen Patient Status:  Inpatient    1/3/1933 MRN [id number]   Location [facility] 7NE-A Attending Brian Lane, DO   Hosp Day # 1 PCP Kostas Bain MD     Subjective:  She notes that her LLE is less painful today.     Objective:  Blood pressure (!) 163/99, pulse 86, temperature 97.3 °F (36.3 °C), temperature source Temporal, resp. rate 18, height 5' 2\" (1.575 m), weight 140 lb (63.5 kg), SpO2 93%.  Gen: NAD, alert  CV: RRR, no m/r/g  Lungs: CTA b/l  Abd: Normoactive bs, soft, nt/nd  Extrem: WWP, no edema  Skin: No acute changes    Labs:   Lab Results   Component Value Date    WBC 7.7 2024    HGB 9.6 2024    HCT 29.1 2024    .0 2024    CREATSERUM 1.16 2024    BUN 15 2024     2024    K 3.6 2024     2024    CO2 24.0 2024    GLU 84 2024    CA 8.8 2024    PTT 75.1 2024    INR 1.58 2024    PTP 19.0 2024       Imaging:  N/A      Assessment and Plan:  This is a 89 year old with a past medical history significant for hypothyroidism, HTN, HLD, CAD, polymyalgia rheumatica, atrial fibrillation on Eliquis who presents for cough/sore throat found to have LLE DVT.      LLE DVT: History as above; In brief, the patient has been on Eliquis 5 mg BID for atrial fibrillation since ~2023. She notes that recently she was experiencing cough and an episode of altered mental status. She notes that she was also noticing that her LE was swollen and painful which was new. LLE Doppler on 24 showed likely new posterior tibial vein, CT Chest was negative for PE. She notes that she takes her medications as prescribed, she does not think she misses doses of medications, no recent plane/car rides, isn't very sedentary.  - I discussed with the patient that it is unclear of her etiology for new DVT if she was taking medications as prescribed but for now she is currently on heparin  gtt.   - I discussed that given her pain is better, we will stop heparin gtt and Xarelto 15 mg BID to start this afternoon prior to discharge  - will place Xarelto starter pack prescription to [facility]   - will follow-up with me as outpatient and plans on repeat LLE Doppler in 3/2025 as outpatient  - I have a low suspicion that she has an underlying hypercoagulability mutation that caused this       Romulo Sunshine MD  [facility]  Department of Oncology and Hematology

## 2024-12-19 NOTE — PLAN OF CARE
Assumed care 1900, 12/18, NOC. Voiding per bathroom, refused meds, tele in place, heparin therapeutic till morning, slept overnight, no acute events

## 2024-12-19 NOTE — PLAN OF CARE
A&Ox4, VSS  RA, NSR on tele  Denied SOB or pain  Intermittent cough noted  Maintained droplet precautions  Heparin gtt infusing per order  Safety precautions initiated. Call light in reach.  UP x 1 with RW in room. Voiding adequately in toilet   Patient and daughter updated on care plan and discharge plan

## 2024-12-19 NOTE — PLAN OF CARE
Assumed care at 0730  Orientated x4, NSR, RA   Denies pain     Afebrile   Transitioned to Xarelto   Isolation precautions in place   Needs met     Plan for discharge once cleared by consults         NURSING DISCHARGE NOTE    Discharged Home via Wheelchair.  Accompanied by Family member and Support staff  Belongings Taken by patient/family.    All consults cleared for discharge   Patient educated on discharge paperwork   Pharmacy delivered medications   No further questions

## 2024-12-20 NOTE — DISCHARGE SUMMARY
[facility] Hospitalist Discharge Summary     Patient ID:  Rachelle Larsen  89 year old  1/3/1933    Admit date: 12/17/2024  Discharge date and time: 12/19/2024  6:03 PM   Attending Physician: No att. providers found   Primary Care Physician: Kostas Bain MD   Discharge Diagnoses: Acute deep vein thrombosis (DVT) of proximal vein of left lower extremity (HCC) [I82.4Y2]  Urinary tract infection with hematuria, site unspecified [N39.0, R31.9]  Altered mental status, unspecified altered mental status type [R41.82]    Discharge Condition: Stable    Disposition:  Home     Follow up:   - PCP within 1 week  - Consults: Hematology    Hospital Course:  89 year old female with PMH sig for A-fib on Eliquis, polymyalgia rheumatica, hypertension, hyperlipidemia, hypothyroidism, CAD s/p PCI who presents for cough.  Patient states she has been experiencing a cough and sore throat for about 2 weeks.  She is also been experiencing leg pain and swelling.  Lower extremity duplex ultrasound revealed a DVT in the left posterior tibial vein.  Patient states she had not missed a dose of her medications.  States she lives at home with her daughter.  Hematology was consulted.  Patient was started on heparin drip.  CTA negative for PE.     #LLE DVT  -Pain control as needed  -On heparin drip  -Hematology consulted for further recommendations regarding anticoagulation. Will switch to Xarelto. Follow up OP.      #Acute cystitis  -Previous urine cultures reviewed.  Rocephin day 3/3. .     #RSV infection  -On room air  -Continue supportive care     #CAD s/p PCI  #Hypertension  #Hyperlipidemia  #Lower extremity edema  -Continue statin, metoprolol, Lasix  -Not on aspirin due to history of epistaxis     #Hypothyroidism  -Continue levothyroxine     #Depression  -Continue Lexapro       Exam on Day of DC:  BP (!) 153/103 (BP Location: Right arm)   Pulse 96   Temp 99.4 °F (37.4 °C) (Oral)   Resp 18   Ht 5' 2\" (1.575 m)   Wt 140 lb (63.5 kg)   SpO2 93%    BMI 25.61 kg/m²   Physical Exam:  General: Alert, awake, cooperative.  HEENT:  Normocephalic, atraumatic.  Neck:  Trachea midline.  No JVD.   Chest:  Clear to auscultation bilaterally. No wheezes, rales, or rhonchi.  CV: Irregular rhythm.  Positive S1/S2.   GI: Bowel sounds present in all four quadrants, abdomen is soft, non-tender, non-distended.  Extremities:   Trace bilateral lower extremity.  No cyanosis.  Neurological:  AAOx3.  Moving all extremities.  Skin:  Warm and dry.      I as the attending physician reconciled the current and discharge medications on day of discharge.     Discharge Medication List as of 12/19/2024 12:12 PM        START taking these medications    Details   rivaroxaban 15 & 20 MG Oral Tablet Therapy Pack Take As Directed based on package instructions: Days 1-21: 15 mg by mouth twice daily Days 22-30: 20 mg by mouth once daily, Normal, Disp-1 each, R-0           CONTINUE these medications which have NOT CHANGED    Details   furosemide 20 MG Oral Tab Take 1 tablet (20 mg total) by mouth daily., Historical      valACYclovir 500 MG Oral Tab Take 1 tablet (500 mg total) by mouth 2 (two) times daily. For shingles., Normal, Disp-14 tablet, R-0      gabapentin 100 MG Oral Cap Take 1 capsule (100 mg total) by mouth as needed., Historical      metoprolol tartrate 25 MG Oral Tab Take 1 tablet (25 mg total) by mouth 2 (two) times daily., Normal, Disp-180 tablet, R-1      atorvastatin 40 MG Oral Tab Take 1 tablet (40 mg total) by mouth at bedtime., Normal, Disp-90 tablet, R-1      levothyroxine 75 MCG Oral Tab Take 1 tablet (75 mcg total) by mouth daily., Normal, Disp-90 tablet, R-1      escitalopram 5 MG Oral Tab Take 1 tablet (5 mg total) by mouth daily., Normal, Disp-90 tablet, R-1      predniSONE 10 MG Oral Tab Take in combination with the 20mg according to taper. 80mg daily x 3 days, then 70mg daily x 3 days, then 60mg daily x 3 days, then 50mg daily x 3 days, then 40mg daily., Normal, Disp-90  tablet, R-0      B-Complex Oral Tab Take 1 tablet by mouth daily with lunch., Historical      latanoprost (XALATAN) 0.005 % Ophthalmic Solution Place 1 drop into both eyes nightly., Historical, R-3      aspirin 81 MG Oral Tab EC Take 1 tablet (81 mg total) by mouth daily., Historical           STOP taking these medications       apixaban 5 MG Oral Tab              Brian Lane DO  [facility]

## (undated) NOTE — LETTER
Your patient was recently seen at the [facility] for a hospital follow-up visit. The visit note is attached. Please contact the clinic with any questions at [phone].     Thank you,  RHODA Escobar